# Patient Record
Sex: FEMALE | Race: WHITE | Employment: OTHER | URBAN - METROPOLITAN AREA
[De-identification: names, ages, dates, MRNs, and addresses within clinical notes are randomized per-mention and may not be internally consistent; named-entity substitution may affect disease eponyms.]

---

## 2017-04-12 ENCOUNTER — OFFICE VISIT (OUTPATIENT)
Dept: ORTHOPEDIC SURGERY | Age: 72
End: 2017-04-12

## 2017-04-12 VITALS
BODY MASS INDEX: 27.74 KG/M2 | HEIGHT: 59 IN | HEART RATE: 93 BPM | SYSTOLIC BLOOD PRESSURE: 129 MMHG | DIASTOLIC BLOOD PRESSURE: 85 MMHG | WEIGHT: 137.6 LBS

## 2017-04-12 DIAGNOSIS — M79.644 FINGER PAIN, RIGHT: Primary | ICD-10-CM

## 2017-04-12 PROCEDURE — G8399 PT W/DXA RESULTS DOCUMENT: HCPCS | Performed by: PHYSICIAN ASSISTANT

## 2017-04-12 PROCEDURE — 4040F PNEUMOC VAC/ADMIN/RCVD: CPT | Performed by: PHYSICIAN ASSISTANT

## 2017-04-12 PROCEDURE — 1036F TOBACCO NON-USER: CPT | Performed by: PHYSICIAN ASSISTANT

## 2017-04-12 PROCEDURE — 73140 X-RAY EXAM OF FINGER(S): CPT | Performed by: PHYSICIAN ASSISTANT

## 2017-04-12 PROCEDURE — 3017F COLORECTAL CA SCREEN DOC REV: CPT | Performed by: PHYSICIAN ASSISTANT

## 2017-04-12 PROCEDURE — 99213 OFFICE O/P EST LOW 20 MIN: CPT | Performed by: PHYSICIAN ASSISTANT

## 2017-04-12 PROCEDURE — G8427 DOCREV CUR MEDS BY ELIG CLIN: HCPCS | Performed by: PHYSICIAN ASSISTANT

## 2017-04-12 PROCEDURE — 1123F ACP DISCUSS/DSCN MKR DOCD: CPT | Performed by: PHYSICIAN ASSISTANT

## 2017-04-12 PROCEDURE — 3014F SCREEN MAMMO DOC REV: CPT | Performed by: PHYSICIAN ASSISTANT

## 2017-04-12 PROCEDURE — G8420 CALC BMI NORM PARAMETERS: HCPCS | Performed by: PHYSICIAN ASSISTANT

## 2017-04-12 PROCEDURE — 1090F PRES/ABSN URINE INCON ASSESS: CPT | Performed by: PHYSICIAN ASSISTANT

## 2017-04-28 ENCOUNTER — HOSPITAL ENCOUNTER (OUTPATIENT)
Dept: MAMMOGRAPHY | Age: 72
Discharge: HOME | End: 2017-04-28
Attending: OBSTETRICS & GYNECOLOGY | Admitting: OBSTETRICS & GYNECOLOGY

## 2017-04-28 DIAGNOSIS — Z12.31 ENCOUNTER FOR SCREENING MAMMOGRAM FOR MALIGNANT NEOPLASM OF BREAST: ICD-10-CM

## 2017-12-02 NOTE — OP NOTE
solutions placed on the eye. The eye was covered with a metal shield. The patient went to the PACU in excellent condition, having tolerated the procedure extremely well, and will follow up with me tomorrow for postop day 1 care.     Stevie Winn MD, PhD, FACS

## 2017-12-07 ENCOUNTER — HOSPITAL ENCOUNTER (OUTPATIENT)
Dept: SURGERY | Age: 72
Discharge: HOME | End: 2017-12-07
Attending: OPHTHALMOLOGY | Admitting: OPHTHALMOLOGY

## 2017-12-07 VITALS
WEIGHT: 135 LBS | DIASTOLIC BLOOD PRESSURE: 75 MMHG | TEMPERATURE: 97.1 F | SYSTOLIC BLOOD PRESSURE: 145 MMHG | OXYGEN SATURATION: 96 % | BODY MASS INDEX: 26.5 KG/M2 | HEIGHT: 60 IN | HEART RATE: 88 BPM | RESPIRATION RATE: 16 BRPM

## 2017-12-07 RX ORDER — LIDOCAINE HYDROCHLORIDE 10 MG/ML
2 INJECTION, SOLUTION INFILTRATION; PERINEURAL
Status: DISPENSED | OUTPATIENT
Start: 2017-12-07 | End: 2017-12-07

## 2017-12-07 RX ORDER — SODIUM CHLORIDE, SODIUM LACTATE, POTASSIUM CHLORIDE, CALCIUM CHLORIDE 600; 310; 30; 20 MG/100ML; MG/100ML; MG/100ML; MG/100ML
INJECTION, SOLUTION INTRAVENOUS CONTINUOUS
Status: DISCONTINUED | OUTPATIENT
Start: 2017-12-07 | End: 2017-12-08 | Stop reason: HOSPADM

## 2017-12-07 RX ADMIN — SODIUM CHLORIDE, SODIUM LACTATE, POTASSIUM CHLORIDE, CALCIUM CHLORIDE: 600; 310; 30; 20 INJECTION, SOLUTION INTRAVENOUS at 08:00

## 2017-12-07 ASSESSMENT — PAIN SCALES - GENERAL: PAINLEVEL_OUTOF10: 0

## 2017-12-07 ASSESSMENT — PAIN - FUNCTIONAL ASSESSMENT: PAIN_FUNCTIONAL_ASSESSMENT: 0-10

## 2017-12-07 NOTE — ANESTHESIA PRE-OP
Department of Anesthesiology  Preprocedure Note       Name:  Abimael Yu   Age:  81 y.o.  :  1945                                          MRN:  [id number]         Date:  2017      Surgeon:    Procedure:    Medications prior to admission:   Prior to Admission medications    Medication Sig Start Date End Date Taking?  Authorizing Provider   CALCIUM PO Take by mouth daily   Yes Historical Provider, MD   Omega-3 Fatty Acids (FISH OIL PO) Take by mouth daily   Yes Historical Provider, MD   Multiple Vitamins-Minerals (MULTIVITAMIN PO) Take by mouth daily   Yes Historical Provider, MD   losartan-hydrochlorothiazide (HYZAAR) 100-12.5 MG per tablet Take 1 tablet by mouth daily   Yes Historical Provider, MD   balsalazide (COLAZAL) 750 MG capsule Take 2,250 mg by mouth 3 times daily   Yes Historical Provider, MD   dicyclomine (BENTYL) 20 MG tablet Take 20 mg by mouth 3 times daily    Yes Historical Provider, MD   omeprazole (PRILOSEC) 40 MG capsule Take 40 mg by mouth daily   Yes Historical Provider, MD   atorvastatin (LIPITOR) 20 MG tablet Take 20 mg by mouth daily   Yes Historical Provider, MD   aspirin 81 MG tablet Take 81 mg by mouth daily    Historical Provider, MD       Current medications:    Current Outpatient Prescriptions   Medication Sig Dispense Refill    CALCIUM PO Take by mouth daily      Omega-3 Fatty Acids (FISH OIL PO) Take by mouth daily      Multiple Vitamins-Minerals (MULTIVITAMIN PO) Take by mouth daily      losartan-hydrochlorothiazide (HYZAAR) 100-12.5 MG per tablet Take 1 tablet by mouth daily      balsalazide (COLAZAL) 750 MG capsule Take 2,250 mg by mouth 3 times daily      dicyclomine (BENTYL) 20 MG tablet Take 20 mg by mouth 3 times daily       omeprazole (PRILOSEC) 40 MG capsule Take 40 mg by mouth daily      atorvastatin (LIPITOR) 20 MG tablet Take 20 mg by mouth daily      aspirin 81 MG tablet Take 81 mg by mouth daily       Current Facility-Administered Medications

## 2018-07-17 ENCOUNTER — HOSPITAL ENCOUNTER (OUTPATIENT)
Dept: WOMENS IMAGING | Age: 73
Discharge: HOME | End: 2018-07-17
Payer: MEDICARE

## 2018-07-17 DIAGNOSIS — M85.80 OTHER SPECIFIED DISORDERS OF BONE DENSITY AND STRUCTURE, UNSPECIFIED SITE (CODE): ICD-10-CM

## 2018-07-17 DIAGNOSIS — M85.80 OSTEOPENIA, UNSPECIFIED LOCATION: ICD-10-CM

## 2018-07-17 DIAGNOSIS — Z12.31 ENCOUNTER FOR SCREENING MAMMOGRAM FOR BREAST CANCER: ICD-10-CM

## 2018-07-17 PROCEDURE — 77067 SCR MAMMO BI INCL CAD: CPT

## 2018-07-17 PROCEDURE — 77080 DXA BONE DENSITY AXIAL: CPT

## 2019-04-25 ENCOUNTER — OFFICE VISIT (OUTPATIENT)
Dept: ORTHOPEDIC SURGERY | Age: 74
End: 2019-04-25
Payer: MEDICARE

## 2019-04-25 VITALS — WEIGHT: 134.92 LBS | HEIGHT: 60 IN | RESPIRATION RATE: 15 BRPM | BODY MASS INDEX: 26.49 KG/M2

## 2019-04-25 DIAGNOSIS — M65.341 TRIGGER FINGER, RIGHT RING FINGER: ICD-10-CM

## 2019-04-25 PROCEDURE — 1090F PRES/ABSN URINE INCON ASSESS: CPT | Performed by: ORTHOPAEDIC SURGERY

## 2019-04-25 PROCEDURE — G8399 PT W/DXA RESULTS DOCUMENT: HCPCS | Performed by: ORTHOPAEDIC SURGERY

## 2019-04-25 PROCEDURE — 20550 NJX 1 TENDON SHEATH/LIGAMENT: CPT | Performed by: ORTHOPAEDIC SURGERY

## 2019-04-25 PROCEDURE — 3017F COLORECTAL CA SCREEN DOC REV: CPT | Performed by: ORTHOPAEDIC SURGERY

## 2019-04-25 PROCEDURE — 1036F TOBACCO NON-USER: CPT | Performed by: ORTHOPAEDIC SURGERY

## 2019-04-25 PROCEDURE — 99213 OFFICE O/P EST LOW 20 MIN: CPT | Performed by: ORTHOPAEDIC SURGERY

## 2019-04-25 PROCEDURE — G8419 CALC BMI OUT NRM PARAM NOF/U: HCPCS | Performed by: ORTHOPAEDIC SURGERY

## 2019-04-25 PROCEDURE — 4040F PNEUMOC VAC/ADMIN/RCVD: CPT | Performed by: ORTHOPAEDIC SURGERY

## 2019-04-25 PROCEDURE — 1123F ACP DISCUSS/DSCN MKR DOCD: CPT | Performed by: ORTHOPAEDIC SURGERY

## 2019-04-25 PROCEDURE — G8427 DOCREV CUR MEDS BY ELIG CLIN: HCPCS | Performed by: ORTHOPAEDIC SURGERY

## 2019-04-25 RX ORDER — TRAZODONE HYDROCHLORIDE 50 MG/1
TABLET ORAL
Refills: 0 | COMMUNITY
Start: 2019-03-14

## 2019-04-25 RX ORDER — TOLTERODINE 4 MG/1
4 CAPSULE, EXTENDED RELEASE ORAL PRN
COMMUNITY

## 2019-04-25 NOTE — PROGRESS NOTES
HISTORY OF PRESENT ILLNESS:  The patient is a 37-year-old right-hand-dominant female and I've seen her  in the past for trigger finger. She presents today for herself or new hand surgery specialty evaluation regarding locking of the right ring finger which has been ongoing for the last several weeks. She does not remember any specific injury. She has noticed some osteoarthritic changes to multiple IP joints and they have not been changing over the last few weeks as well. PAST MEDICAL HISTORY: Patient's medications, allergies, past medical, surgical, social and family histories were reviewed and updated as appropriate. ROS: Pertinent items are noted in HPI. Review of systems reviewed from patient history form dated on 4/25/19 and available in the patient's chart under the media tab. Denies fever, chills, confusion, bowel/bladder active change. PHYSICAL EXAMINATION: Examination reveals a pleasant individual in no acute distress. There appears to be satisfactory pain-free range of motion, strength, and stability of the cervical spine, shoulders, and elbows. Skin is intact without lymphadenopathy, discoloration, or abnormal temperature. There is intact, symmetric circulation in both upper extremities. Wrist, hand, and digital range of motion is satisfactory bilaterally in the unaffected digits. Does have some broadening at the IP's consistent with osteoarthritis but full range of motion. Tenderness exists at the A1 pulley of the ring finger with grade 2 triggering. DIAGNOSTIC TESTING:        IMPRESSION AND PLAN: Right ring trigger digit. I discussed the diagnosis with her and we talked about treatment options. I've offered her an initial cortisone injection for the right ring trigger digit. Under sterile conditions, I injected the right ring finger  at the A1 pulley and flexor tendon sheath with 1% lidocaine and 1 mL of Celestone.  Appropriate injection risks and instructions were discussed. Over the next 4-6 weeks if she fails to have lasting relief she would be an excellent candidate for elective trigger finger release if warranted.

## 2019-04-25 NOTE — PROGRESS NOTES
INJECTION ADMINISTRATION DETAILS:    Date & Time:  4/25/19 9:57 AM  Site & Comments: RT RING TRIGGER FINGER  Administered by Dr Irasema Dahl    Betamethasone (30 mg/ml)  # of Cc: 1  NDC:  0998-1957-11  Lot Number: [id number]  EXP: 04/18/2020    1% Lidocaine ( 10mg/mL)  # of CC : 1  NDC #: 3263-1835-12  LOT# :-[id number]  EXP :10/1/2020

## 2019-08-09 ENCOUNTER — OFFICE VISIT (OUTPATIENT)
Dept: ORTHOPEDIC SURGERY | Age: 74
End: 2019-08-09
Payer: MEDICARE

## 2019-08-09 VITALS — HEIGHT: 60 IN | WEIGHT: 134.92 LBS | RESPIRATION RATE: 15 BRPM | BODY MASS INDEX: 26.49 KG/M2

## 2019-08-09 DIAGNOSIS — M65.341 TRIGGER FINGER, RIGHT RING FINGER: Primary | ICD-10-CM

## 2019-08-09 PROCEDURE — 1036F TOBACCO NON-USER: CPT | Performed by: ORTHOPAEDIC SURGERY

## 2019-08-09 PROCEDURE — G8427 DOCREV CUR MEDS BY ELIG CLIN: HCPCS | Performed by: ORTHOPAEDIC SURGERY

## 2019-08-09 PROCEDURE — 99214 OFFICE O/P EST MOD 30 MIN: CPT | Performed by: ORTHOPAEDIC SURGERY

## 2019-08-09 PROCEDURE — G8419 CALC BMI OUT NRM PARAM NOF/U: HCPCS | Performed by: ORTHOPAEDIC SURGERY

## 2019-08-09 PROCEDURE — 1123F ACP DISCUSS/DSCN MKR DOCD: CPT | Performed by: ORTHOPAEDIC SURGERY

## 2019-08-09 PROCEDURE — G8399 PT W/DXA RESULTS DOCUMENT: HCPCS | Performed by: ORTHOPAEDIC SURGERY

## 2019-08-09 PROCEDURE — 3017F COLORECTAL CA SCREEN DOC REV: CPT | Performed by: ORTHOPAEDIC SURGERY

## 2019-08-09 PROCEDURE — 1090F PRES/ABSN URINE INCON ASSESS: CPT | Performed by: ORTHOPAEDIC SURGERY

## 2019-08-09 PROCEDURE — 4040F PNEUMOC VAC/ADMIN/RCVD: CPT | Performed by: ORTHOPAEDIC SURGERY

## 2019-08-14 ENCOUNTER — TELEPHONE (OUTPATIENT)
Dept: ORTHOPEDIC SURGERY | Age: 74
End: 2019-08-14

## 2019-08-14 NOTE — TELEPHONE ENCOUNTER
Auth: NPR  Date: 8/26/19   Reference # None  Spoke with: None  Type of SX: Outpatient  Location: [facility]  CPT [id number]   SX area: Rt hand

## 2019-08-23 ENCOUNTER — ANESTHESIA EVENT (OUTPATIENT)
Dept: OPERATING ROOM | Age: 74
End: 2019-08-23
Payer: MEDICARE

## 2019-08-25 NOTE — OP NOTE
[facility]  Procedure Note  Chelsy Rubi  08/26/2019    PREOPERATIVE DIAGNOSIS(ES)   Right Ring Finger trigger digit     POSTOPERATIVE DIAGNOSIS(ES)   Same    PROCEDURE(S)     Right Ring Finger trigger release    Alisson Hernandez  89. with MAC    COMPLICATIONS None    INDICATIONS FOR PROCEDURE The patient has clinical evidence of triggering of the affected digit(s) and has failed appropriate conservative management. The patient understands the relevant risks, benefits, limitations, and healing process of the procedure. PROCEDURE The patient was brought to the operating room and anesthetized with local anesthetic and MAC sedation. The identified and marked extremity was then prepped and draped in a standard fashion. A well-padded tourniquet was applied to the upper arm. The arm was exsanguinated and the tourniquet elevated to 250 mmHg. A standard transverse incision was made at the A-1 pulley level of the affected digit(s). Dissection was carried down carefully through the skin and subcutaneous tissue. Care was taken to protect the digital neurovascular bundles on both sides of the identified A-1 pulley(s). In each affected digit the pulley was incised longitudinally under direct visualization. Complete proximal and distal release was completed. The flexor tendon structures were withdrawn with a retractor and demonstrated full passive excursion. The tourniquet was released and the wound(s) irrigated with sterile saline. Skin was closed with Nylon suture. A soft, bulky dressing was applied and the patient transported to the recovery area in stable condition with good perfusion to all fingertips and no active triggering.         Stacey. Harika Gilmore 134

## 2019-08-26 ENCOUNTER — HOSPITAL ENCOUNTER (OUTPATIENT)
Age: 74
Setting detail: OUTPATIENT SURGERY
Discharge: HOME | End: 2019-08-26
Attending: ORTHOPAEDIC SURGERY | Admitting: ORTHOPAEDIC SURGERY
Payer: MEDICARE

## 2019-08-26 ENCOUNTER — ANESTHESIA (OUTPATIENT)
Dept: OPERATING ROOM | Age: 74
End: 2019-08-26
Payer: MEDICARE

## 2019-08-26 VITALS — SYSTOLIC BLOOD PRESSURE: 129 MMHG | DIASTOLIC BLOOD PRESSURE: 64 MMHG | OXYGEN SATURATION: 83 %

## 2019-08-26 VITALS
HEIGHT: 60 IN | DIASTOLIC BLOOD PRESSURE: 62 MMHG | RESPIRATION RATE: 18 BRPM | HEART RATE: 75 BPM | SYSTOLIC BLOOD PRESSURE: 129 MMHG | OXYGEN SATURATION: 93 % | TEMPERATURE: 97.5 F | BODY MASS INDEX: 27.48 KG/M2 | WEIGHT: 140 LBS

## 2019-08-26 DIAGNOSIS — M65.341 TRIGGER FINGER, RIGHT RING FINGER: Primary | ICD-10-CM

## 2019-08-26 PROCEDURE — 3600000003 HC SURGERY LEVEL 3 BASE: Performed by: ORTHOPAEDIC SURGERY

## 2019-08-26 PROCEDURE — 7100000001 HC PACU RECOVERY - ADDTL 15 MIN: Performed by: ORTHOPAEDIC SURGERY

## 2019-08-26 PROCEDURE — 2709999900 HC NON-CHARGEABLE SUPPLY: Performed by: ORTHOPAEDIC SURGERY

## 2019-08-26 PROCEDURE — 3600000013 HC SURGERY LEVEL 3 ADDTL 15MIN: Performed by: ORTHOPAEDIC SURGERY

## 2019-08-26 PROCEDURE — 2580000003 HC RX 258: Performed by: ORTHOPAEDIC SURGERY

## 2019-08-26 PROCEDURE — 2500000003 HC RX 250 WO HCPCS: Performed by: ANESTHESIOLOGY

## 2019-08-26 PROCEDURE — 3700000001 HC ADD 15 MINUTES (ANESTHESIA): Performed by: ORTHOPAEDIC SURGERY

## 2019-08-26 PROCEDURE — 6360000002 HC RX W HCPCS: Performed by: NURSE ANESTHETIST, CERTIFIED REGISTERED

## 2019-08-26 PROCEDURE — 7100000010 HC PHASE II RECOVERY - FIRST 15 MIN: Performed by: ORTHOPAEDIC SURGERY

## 2019-08-26 PROCEDURE — 2500000003 HC RX 250 WO HCPCS: Performed by: ORTHOPAEDIC SURGERY

## 2019-08-26 PROCEDURE — 7100000000 HC PACU RECOVERY - FIRST 15 MIN: Performed by: ORTHOPAEDIC SURGERY

## 2019-08-26 PROCEDURE — 7100000011 HC PHASE II RECOVERY - ADDTL 15 MIN: Performed by: ORTHOPAEDIC SURGERY

## 2019-08-26 PROCEDURE — 3700000000 HC ANESTHESIA ATTENDED CARE: Performed by: ORTHOPAEDIC SURGERY

## 2019-08-26 PROCEDURE — 2580000003 HC RX 258: Performed by: ANESTHESIOLOGY

## 2019-08-26 RX ORDER — HYDRALAZINE HYDROCHLORIDE 20 MG/ML
5 INJECTION INTRAMUSCULAR; INTRAVENOUS EVERY 10 MIN PRN
Status: DISCONTINUED | OUTPATIENT
Start: 2019-08-26 | End: 2019-08-26 | Stop reason: HOSPADM

## 2019-08-26 RX ORDER — MEPERIDINE HYDROCHLORIDE 50 MG/ML
12.5 INJECTION INTRAMUSCULAR; INTRAVENOUS; SUBCUTANEOUS EVERY 5 MIN PRN
Status: DISCONTINUED | OUTPATIENT
Start: 2019-08-26 | End: 2019-08-26 | Stop reason: HOSPADM

## 2019-08-26 RX ORDER — SODIUM CHLORIDE 0.9 % (FLUSH) 0.9 %
10 SYRINGE (ML) INJECTION EVERY 12 HOURS SCHEDULED
Status: DISCONTINUED | OUTPATIENT
Start: 2019-08-26 | End: 2019-08-26 | Stop reason: HOSPADM

## 2019-08-26 RX ORDER — HYDROCODONE BITARTRATE AND ACETAMINOPHEN 5; 325 MG/1; MG/1
1 TABLET ORAL EVERY 6 HOURS PRN
Qty: 5 TABLET | Refills: 0 | Status: SHIPPED | OUTPATIENT
Start: 2019-08-26 | End: 2019-09-02

## 2019-08-26 RX ORDER — METOCLOPRAMIDE HYDROCHLORIDE 5 MG/ML
10 INJECTION INTRAMUSCULAR; INTRAVENOUS
Status: DISCONTINUED | OUTPATIENT
Start: 2019-08-26 | End: 2019-08-26 | Stop reason: HOSPADM

## 2019-08-26 RX ORDER — OXYCODONE HYDROCHLORIDE 5 MG/1
10 TABLET ORAL PRN
Status: DISCONTINUED | OUTPATIENT
Start: 2019-08-26 | End: 2019-08-26 | Stop reason: HOSPADM

## 2019-08-26 RX ORDER — PROMETHAZINE HYDROCHLORIDE 25 MG/ML
6.25 INJECTION, SOLUTION INTRAMUSCULAR; INTRAVENOUS
Status: DISCONTINUED | OUTPATIENT
Start: 2019-08-26 | End: 2019-08-26 | Stop reason: HOSPADM

## 2019-08-26 RX ORDER — IBUPROFEN 600 MG/1
600 TABLET ORAL EVERY 6 HOURS PRN
Qty: 60 TABLET | Refills: 1 | Status: SHIPPED | OUTPATIENT
Start: 2019-08-26 | End: 2020-04-20

## 2019-08-26 RX ORDER — MAGNESIUM HYDROXIDE 1200 MG/15ML
LIQUID ORAL CONTINUOUS PRN
Status: COMPLETED | OUTPATIENT
Start: 2019-08-26 | End: 2019-08-26

## 2019-08-26 RX ORDER — LABETALOL HYDROCHLORIDE 5 MG/ML
5 INJECTION, SOLUTION INTRAVENOUS EVERY 10 MIN PRN
Status: DISCONTINUED | OUTPATIENT
Start: 2019-08-26 | End: 2019-08-26 | Stop reason: HOSPADM

## 2019-08-26 RX ORDER — DIPHENHYDRAMINE HYDROCHLORIDE 50 MG/ML
12.5 INJECTION INTRAMUSCULAR; INTRAVENOUS
Status: DISCONTINUED | OUTPATIENT
Start: 2019-08-26 | End: 2019-08-26 | Stop reason: HOSPADM

## 2019-08-26 RX ORDER — ONDANSETRON 2 MG/ML
INJECTION INTRAMUSCULAR; INTRAVENOUS PRN
Status: DISCONTINUED | OUTPATIENT
Start: 2019-08-26 | End: 2019-08-26 | Stop reason: SDUPTHER

## 2019-08-26 RX ORDER — OXYCODONE HYDROCHLORIDE 5 MG/1
5 TABLET ORAL PRN
Status: DISCONTINUED | OUTPATIENT
Start: 2019-08-26 | End: 2019-08-26 | Stop reason: HOSPADM

## 2019-08-26 RX ORDER — PROPOFOL 10 MG/ML
INJECTION, EMULSION INTRAVENOUS PRN
Status: DISCONTINUED | OUTPATIENT
Start: 2019-08-26 | End: 2019-08-26 | Stop reason: SDUPTHER

## 2019-08-26 RX ORDER — SODIUM CHLORIDE, SODIUM LACTATE, POTASSIUM CHLORIDE, CALCIUM CHLORIDE 600; 310; 30; 20 MG/100ML; MG/100ML; MG/100ML; MG/100ML
INJECTION, SOLUTION INTRAVENOUS CONTINUOUS
Status: DISCONTINUED | OUTPATIENT
Start: 2019-08-26 | End: 2019-08-26 | Stop reason: HOSPADM

## 2019-08-26 RX ORDER — SODIUM CHLORIDE 0.9 % (FLUSH) 0.9 %
10 SYRINGE (ML) INJECTION PRN
Status: DISCONTINUED | OUTPATIENT
Start: 2019-08-26 | End: 2019-08-26 | Stop reason: HOSPADM

## 2019-08-26 RX ORDER — MIDAZOLAM HYDROCHLORIDE 1 MG/ML
INJECTION INTRAMUSCULAR; INTRAVENOUS PRN
Status: DISCONTINUED | OUTPATIENT
Start: 2019-08-26 | End: 2019-08-26 | Stop reason: SDUPTHER

## 2019-08-26 RX ORDER — DEXAMETHASONE SODIUM PHOSPHATE 10 MG/ML
INJECTION INTRAMUSCULAR; INTRAVENOUS PRN
Status: DISCONTINUED | OUTPATIENT
Start: 2019-08-26 | End: 2019-08-26 | Stop reason: SDUPTHER

## 2019-08-26 RX ORDER — MORPHINE SULFATE 2 MG/ML
1 INJECTION, SOLUTION INTRAMUSCULAR; INTRAVENOUS EVERY 5 MIN PRN
Status: DISCONTINUED | OUTPATIENT
Start: 2019-08-26 | End: 2019-08-26 | Stop reason: HOSPADM

## 2019-08-26 RX ADMIN — PROPOFOL 60 MG: 10 INJECTION, EMULSION INTRAVENOUS at 09:40

## 2019-08-26 RX ADMIN — MIDAZOLAM HYDROCHLORIDE 2.5 MG: 2 INJECTION, SOLUTION INTRAMUSCULAR; INTRAVENOUS at 09:39

## 2019-08-26 RX ADMIN — FAMOTIDINE 20 MG: 10 INJECTION, SOLUTION INTRAVENOUS at 07:41

## 2019-08-26 RX ADMIN — ONDANSETRON 4 MG: 2 INJECTION INTRAMUSCULAR; INTRAVENOUS at 09:44

## 2019-08-26 RX ADMIN — SODIUM CHLORIDE, POTASSIUM CHLORIDE, SODIUM LACTATE AND CALCIUM CHLORIDE: 600; 310; 30; 20 INJECTION, SOLUTION INTRAVENOUS at 07:41

## 2019-08-26 RX ADMIN — DEXAMETHASONE SODIUM PHOSPHATE 6 MG: 10 INJECTION INTRAMUSCULAR; INTRAVENOUS at 09:44

## 2019-08-26 RX ADMIN — PROPOFOL 20 MG: 10 INJECTION, EMULSION INTRAVENOUS at 09:47

## 2019-08-26 RX ADMIN — MIDAZOLAM HYDROCHLORIDE 2.5 MG: 2 INJECTION, SOLUTION INTRAMUSCULAR; INTRAVENOUS at 09:37

## 2019-08-26 ASSESSMENT — PULMONARY FUNCTION TESTS
PIF_VALUE: 0
PIF_VALUE: 0
PIF_VALUE: 1

## 2019-08-26 ASSESSMENT — PAIN SCALES - GENERAL
PAINLEVEL_OUTOF10: 0

## 2019-08-26 NOTE — ANESTHESIA PRE PROCEDURE
 CALCIUM PO Take by mouth daily      Omega-3 Fatty Acids (FISH OIL PO) Take by mouth daily      Multiple Vitamins-Minerals (MULTIVITAMIN PO) Take by mouth daily      losartan-hydrochlorothiazide (HYZAAR) 100-12.5 MG per tablet Take 1 tablet by mouth daily      balsalazide (COLAZAL) 750 MG capsule Take 2,250 mg by mouth 3 times daily      dicyclomine (BENTYL) 20 MG tablet Take 20 mg by mouth 3 times daily       omeprazole (PRILOSEC) 40 MG capsule Take 40 mg by mouth daily      atorvastatin (LIPITOR) 20 MG tablet Take 20 mg by mouth daily         Allergies: Allergies   Allergen Reactions    Diprivan [Propofol] Nausea Only       Problem List:    Patient Active Problem List   Diagnosis Code    Biceps tendinitis on right M75.21    Trigger finger, right ring finger M65.341       Past Medical History:        Diagnosis Date    HTN (hypertension)     Hyperlipidemia     Osteoarthritis     Osteoporosis     PONV (postoperative nausea and vomiting)     with Diprivan       Past Surgical History:        Procedure Laterality Date    APPENDECTOMY      CATARACT REMOVAL WITH IMPLANT Right 12/07/2017    CHOLECYSTECTOMY      EYE SURGERY Right     retina       Social History:    Social History     Tobacco Use    Smoking status: Never Smoker    Smokeless tobacco: Never Used   Substance Use Topics    Alcohol use:  Yes     Alcohol/week: 0.0 standard drinks     Comment: 0-1 drinks per week                                Counseling given: Not Answered      Vital Signs (Current):   Vitals:    08/20/19 0942   Weight: 140 lb (63.5 kg)   Height: 5' (1.524 m)                                              BP Readings from Last 3 Encounters:   12/07/17 (!) 145/75   04/12/17 129/85   10/05/15 105/66       NPO Status:                                                                                 BMI:   Wt Readings from Last 3 Encounters:   08/09/19 134 lb 14.7 oz (61.2 kg)   04/25/19 134 lb 14.7 oz (61.2 kg)   12/07/17 135 lb (61.2 kg)     Body mass index is 27.34 kg/m². CBC:   Lab Results   Component Value Date    WBC 19.3 02/18/2010    RBC 4.72 02/18/2010    HGB 14.3 02/18/2010    HCT 42.6 02/18/2010    MCV 90.2 02/18/2010    RDW 14.2 02/18/2010     02/18/2010       CMP:   Lab Results   Component Value Date     02/18/2010    K 3.8 02/18/2010     02/18/2010    CO2 29 02/18/2010    BUN 13 02/18/2010    CREATININE 0.6 02/18/2010    GFRAA >60 02/18/2010    GLUCOSE 127 02/18/2010    PROT 7.2 02/18/2010    CALCIUM 10.2 02/18/2010    BILITOT 0.43 02/18/2010    ALKPHOS 119 02/18/2010    AST 28 02/18/2010    ALT 25 02/18/2010       POC Tests: No results for input(s): POCGLU, POCNA, POCK, POCCL, POCBUN, POCHEMO, POCHCT in the last 72 hours. Coags: No results found for: PROTIME, INR, APTT    HCG (If Applicable): No results found for: PREGTESTUR, PREGSERUM, HCG, HCGQUANT     ABGs: No results found for: PHART, PO2ART, AOT9VSZ, TZJ7VTR, BEART, X4PPWLLJ     Type & Screen (If Applicable):  No results found for: [facility]    Anesthesia Evaluation  Patient summary reviewed and Nursing notes reviewed history of anesthetic complications:   Airway: Mallampati: II  TM distance: >3 FB   Neck ROM: full  Mouth opening: > = 3 FB Dental:          Pulmonary:Negative Pulmonary ROS                              Cardiovascular:    (+) hypertension:,                   Neuro/Psych:   Negative Neuro/Psych ROS              GI/Hepatic/Renal: Neg GI/Hepatic/Renal ROS            Endo/Other: Negative Endo/Other ROS                    Abdominal:           Vascular: negative vascular ROS. Anesthesia Plan      general     ASA 3    (70-year-old female presents for RIGHT RING TRIGGER FINGER RELEASE (Right ). Plan general anesthesia with ASA standard monitors. Questions answered. Patient agreeable with anesthetic plan.  )  Induction: intravenous.       Anesthetic plan and risks discussed with

## 2019-08-26 NOTE — PROGRESS NOTES
Discharge instructions reviewed with patient/responsible adult and understanding verbalized. Discharge instructions signed and copies given. Patient discharged home with belongings. No pain. No n/v. Right hand dressing dry and intact. Assist of one to car.

## 2019-08-27 ENCOUNTER — TELEPHONE (OUTPATIENT)
Dept: ORTHOPEDIC SURGERY | Age: 74
End: 2019-08-27

## 2019-08-27 ENCOUNTER — OFFICE VISIT (OUTPATIENT)
Dept: ORTHOPEDIC SURGERY | Age: 74
End: 2019-08-27

## 2019-08-27 DIAGNOSIS — M65.341 TRIGGER FINGER, RIGHT RING FINGER: Primary | ICD-10-CM

## 2019-08-27 PROCEDURE — 99024 POSTOP FOLLOW-UP VISIT: CPT | Performed by: ORTHOPAEDIC SURGERY

## 2019-08-27 NOTE — PROGRESS NOTES
Chief Complaint:  Finger Pain (EARLY PO CK RT RING TFR 8/26/19, HAVING SWELLING AND WARMTH IN FINGERS)      History of Present of Illness: The patient is back and was somewhat concerned after her trigger finger release because her fingers were numb. Review of Systems  Pertinent items are noted in HPI  Denies fever, chills, confusion, bowel/bladder active change. Examination:  Exam today she is moving her fingers very well and there is no sign of infection and only trace swelling. Fingers are perfused and she does have protective sensation in the area of the fingertips. There is no residual triggering. Radiology:     X-rays obtained and reviewed in office:  Views    Location    Impression      No orders of the defined types were placed in this encounter. Impression:  Encounter Diagnosis   Name Primary?  Trigger finger, right ring finger Yes         Treatment Plan:  I reassured the patient that she has an excellent healthy-appearing hand postoperatively and these are to be expected after surgery. She seems relieved by the reassurance and she will continue with the wound care as instructed. She will see us at her regularly scheduled appointment. Please note that this transcription was created using voice recognition software. Any errors are unintentional and may be due to voice recognition transcription.

## 2019-09-09 ENCOUNTER — OFFICE VISIT (OUTPATIENT)
Dept: ORTHOPEDIC SURGERY | Age: 74
End: 2019-09-09

## 2019-09-09 DIAGNOSIS — M65.341 TRIGGER FINGER, RIGHT RING FINGER: Primary | ICD-10-CM

## 2019-09-09 PROCEDURE — 99024 POSTOP FOLLOW-UP VISIT: CPT | Performed by: ORTHOPAEDIC SURGERY

## 2019-10-03 ENCOUNTER — HOSPITAL ENCOUNTER (OUTPATIENT)
Dept: WOMENS IMAGING | Age: 74
Discharge: HOME | End: 2019-10-03
Payer: MEDICARE

## 2019-10-03 DIAGNOSIS — Z12.31 ENCOUNTER FOR SCREENING MAMMOGRAM FOR BREAST CANCER: ICD-10-CM

## 2019-10-03 PROCEDURE — 77067 SCR MAMMO BI INCL CAD: CPT

## 2020-04-20 ENCOUNTER — OFFICE VISIT (OUTPATIENT)
Dept: ORTHOPEDIC SURGERY | Age: 75
End: 2020-04-20
Payer: MEDICARE

## 2020-04-20 VITALS — BODY MASS INDEX: 28.22 KG/M2 | WEIGHT: 140 LBS | HEIGHT: 59 IN

## 2020-04-20 PROCEDURE — 1090F PRES/ABSN URINE INCON ASSESS: CPT | Performed by: ORTHOPAEDIC SURGERY

## 2020-04-20 PROCEDURE — G8427 DOCREV CUR MEDS BY ELIG CLIN: HCPCS | Performed by: ORTHOPAEDIC SURGERY

## 2020-04-20 PROCEDURE — 4040F PNEUMOC VAC/ADMIN/RCVD: CPT | Performed by: ORTHOPAEDIC SURGERY

## 2020-04-20 PROCEDURE — G8417 CALC BMI ABV UP PARAM F/U: HCPCS | Performed by: ORTHOPAEDIC SURGERY

## 2020-04-20 PROCEDURE — G8399 PT W/DXA RESULTS DOCUMENT: HCPCS | Performed by: ORTHOPAEDIC SURGERY

## 2020-04-20 PROCEDURE — 1123F ACP DISCUSS/DSCN MKR DOCD: CPT | Performed by: ORTHOPAEDIC SURGERY

## 2020-04-20 PROCEDURE — E0100 CANE ADJUST/FIXED WITH TIP: HCPCS | Performed by: ORTHOPAEDIC SURGERY

## 2020-04-20 PROCEDURE — 99213 OFFICE O/P EST LOW 20 MIN: CPT | Performed by: ORTHOPAEDIC SURGERY

## 2020-04-20 PROCEDURE — 3017F COLORECTAL CA SCREEN DOC REV: CPT | Performed by: ORTHOPAEDIC SURGERY

## 2020-04-20 PROCEDURE — 1036F TOBACCO NON-USER: CPT | Performed by: ORTHOPAEDIC SURGERY

## 2020-05-18 ENCOUNTER — OFFICE VISIT (OUTPATIENT)
Dept: ORTHOPEDIC SURGERY | Age: 75
End: 2020-05-18
Payer: MEDICARE

## 2020-05-18 PROCEDURE — 1090F PRES/ABSN URINE INCON ASSESS: CPT | Performed by: ORTHOPAEDIC SURGERY

## 2020-05-18 PROCEDURE — 3017F COLORECTAL CA SCREEN DOC REV: CPT | Performed by: ORTHOPAEDIC SURGERY

## 2020-05-18 PROCEDURE — 99213 OFFICE O/P EST LOW 20 MIN: CPT | Performed by: ORTHOPAEDIC SURGERY

## 2020-05-18 PROCEDURE — G8417 CALC BMI ABV UP PARAM F/U: HCPCS | Performed by: ORTHOPAEDIC SURGERY

## 2020-05-18 PROCEDURE — 4040F PNEUMOC VAC/ADMIN/RCVD: CPT | Performed by: ORTHOPAEDIC SURGERY

## 2020-05-18 PROCEDURE — 1036F TOBACCO NON-USER: CPT | Performed by: ORTHOPAEDIC SURGERY

## 2020-05-18 PROCEDURE — G8428 CUR MEDS NOT DOCUMENT: HCPCS | Performed by: ORTHOPAEDIC SURGERY

## 2020-05-18 PROCEDURE — G8399 PT W/DXA RESULTS DOCUMENT: HCPCS | Performed by: ORTHOPAEDIC SURGERY

## 2020-05-18 PROCEDURE — 1123F ACP DISCUSS/DSCN MKR DOCD: CPT | Performed by: ORTHOPAEDIC SURGERY

## 2020-05-18 NOTE — PROGRESS NOTES
Inspection/examination of the right lower extremity does not show any tenderness, deformity or injury. Range of motion is full. There is no gross instability. There are no rashes, ulcerations or lesions. Strength and tone are normal.  ·   · LEFT LOWER EXTREMITY:  Inspection/examination of the left lower extremity does not show any tenderness, deformity or injury. Range of motion is full. There is no gross instability. There are no rashes, ulcerations or lesions. Strength and tone are normal.        Diagnostic Testing:  X-rays ordered today and reviewed of the lumbar spine. 2 views. AP and lateral views. These did include her hip joints also. No evidence of fractures or dislocations. Well-maintained hip joint space. Mild to moderate lumbar degenerative disc disease most pronounced at L5-S1. There is also grade 1 spondylolisthesis at L5-S1. Impression: Lumbar degenerative disc disease and sacroiliitis. Plan: Patient will do home exercise program concentrating on SI joint stretching. Patient already currently takes NSAIDs for her knee. We will see her back on a as needed basis. If she continues to have issues with her SI joint and lumbar spine would recommend she follow-up with Dr. Jair Lam.         Laura Cha

## 2021-04-28 ENCOUNTER — HOSPITAL ENCOUNTER (OUTPATIENT)
Dept: WOMENS IMAGING | Age: 76
Discharge: HOME | End: 2021-04-28
Payer: MEDICARE

## 2021-04-28 VITALS — BODY MASS INDEX: 28.22 KG/M2 | HEIGHT: 59 IN | WEIGHT: 140 LBS

## 2021-04-28 DIAGNOSIS — Z12.31 VISIT FOR SCREENING MAMMOGRAM: ICD-10-CM

## 2021-04-28 PROCEDURE — 77063 BREAST TOMOSYNTHESIS BI: CPT

## 2021-08-10 ENCOUNTER — HOSPITAL ENCOUNTER (OUTPATIENT)
Dept: WOMENS IMAGING | Age: 76
Discharge: HOME | End: 2021-08-10
Payer: MEDICARE

## 2021-08-10 DIAGNOSIS — M85.80 OSTEOPENIA, UNSPECIFIED LOCATION: ICD-10-CM

## 2021-08-10 DIAGNOSIS — Z78.0 POSTMENOPAUSAL: ICD-10-CM

## 2021-08-10 PROCEDURE — 77080 DXA BONE DENSITY AXIAL: CPT

## 2022-08-01 ENCOUNTER — HOSPITAL ENCOUNTER (OUTPATIENT)
Dept: WOMENS IMAGING | Age: 77
Discharge: HOME | End: 2022-08-01
Payer: MEDICARE

## 2022-08-01 DIAGNOSIS — Z12.31 ENCOUNTER FOR SCREENING MAMMOGRAM FOR MALIGNANT NEOPLASM OF BREAST: ICD-10-CM

## 2022-08-01 PROCEDURE — 77063 BREAST TOMOSYNTHESIS BI: CPT

## 2023-08-03 ENCOUNTER — HOSPITAL ENCOUNTER (OUTPATIENT)
Dept: WOMENS IMAGING | Age: 78
Discharge: HOME | End: 2023-08-03
Payer: MEDICARE

## 2023-08-03 DIAGNOSIS — Z12.31 ENCOUNTER FOR SCREENING MAMMOGRAM FOR BREAST CANCER: ICD-10-CM

## 2023-08-03 PROCEDURE — 77067 SCR MAMMO BI INCL CAD: CPT

## 2023-10-25 NOTE — H&P
The H&P was reviewed, the patient was examined, and no change has occurred in the patient's condition since the H&P was completed.     Maulik Nava MD, PhD, FACS Litfulo Pregnancy And Lactation Text: Based on animal studies, Lifulo may cause embryo-fetal harm when administered to pregnant women.  The medication should not be used in pregnancy.  Breastfeeding is not recommended during treatment.

## 2023-11-25 ENCOUNTER — HOSPITAL ENCOUNTER (EMERGENCY)
Age: 78
Discharge: HOME | End: 2023-11-25
Payer: MEDICARE

## 2023-11-25 ENCOUNTER — APPOINTMENT (OUTPATIENT)
Dept: GENERAL RADIOLOGY | Age: 78
End: 2023-11-25
Payer: MEDICARE

## 2023-11-25 VITALS
SYSTOLIC BLOOD PRESSURE: 133 MMHG | TEMPERATURE: 98.2 F | HEIGHT: 60 IN | RESPIRATION RATE: 16 BRPM | BODY MASS INDEX: 26.11 KG/M2 | HEART RATE: 79 BPM | OXYGEN SATURATION: 100 % | WEIGHT: 133 LBS | DIASTOLIC BLOOD PRESSURE: 70 MMHG

## 2023-11-25 DIAGNOSIS — S42.292A OTHER CLOSED DISPLACED FRACTURE OF PROXIMAL END OF LEFT HUMERUS, INITIAL ENCOUNTER: Primary | ICD-10-CM

## 2023-11-25 PROBLEM — S42.202A CLOSED FRACTURE OF LEFT PROXIMAL HUMERUS: Status: ACTIVE | Noted: 2023-11-25

## 2023-11-25 PROCEDURE — 73060 X-RAY EXAM OF HUMERUS: CPT

## 2023-11-25 PROCEDURE — 99222 1ST HOSP IP/OBS MODERATE 55: CPT | Performed by: ORTHOPAEDIC SURGERY

## 2023-11-25 PROCEDURE — 99283 EMERGENCY DEPT VISIT LOW MDM: CPT

## 2023-11-25 PROCEDURE — 6370000000 HC RX 637 (ALT 250 FOR IP): Performed by: PHYSICIAN ASSISTANT

## 2023-11-25 RX ORDER — OXYCODONE HYDROCHLORIDE AND ACETAMINOPHEN 5; 325 MG/1; MG/1
1 TABLET ORAL EVERY 6 HOURS PRN
Qty: 15 TABLET | Refills: 0 | Status: SHIPPED | OUTPATIENT
Start: 2023-11-25 | End: 2023-11-29

## 2023-11-25 RX ORDER — OXYCODONE HYDROCHLORIDE AND ACETAMINOPHEN 5; 325 MG/1; MG/1
1 TABLET ORAL ONCE
Status: COMPLETED | OUTPATIENT
Start: 2023-11-25 | End: 2023-11-25

## 2023-11-25 RX ADMIN — OXYCODONE HYDROCHLORIDE AND ACETAMINOPHEN 1 TABLET: 5; 325 TABLET ORAL at 19:17

## 2023-11-25 ASSESSMENT — PAIN SCALES - GENERAL
PAINLEVEL_OUTOF10: 8
PAINLEVEL_OUTOF10: 5
PAINLEVEL_OUTOF10: 8

## 2023-11-25 ASSESSMENT — PAIN - FUNCTIONAL ASSESSMENT
PAIN_FUNCTIONAL_ASSESSMENT: 0-10
PAIN_FUNCTIONAL_ASSESSMENT: NONE - DENIES PAIN

## 2023-11-25 ASSESSMENT — PAIN DESCRIPTION - ORIENTATION: ORIENTATION: LEFT

## 2023-11-25 ASSESSMENT — PAIN DESCRIPTION - LOCATION: LOCATION: ARM

## 2023-11-26 NOTE — CONSULTS
[facility] Orthopedic Surgery  Consult Note         This patient is seen in consultation at the request of Luisa Pompa. Reason for Consult:  Left shoulder pain/ minimally displaced proximal humerus fracture. CHIEF COMPLAINT:  Left shoulder pain/ fall. History Obtained From:  patient, spouse, electronic medical record    HISTORY OF PRESENT ILLNESS:    Ms. Gamaliel Bell is a 70 y.o.  female right handed who seen today at [facility] ED for evaluation of a left shoulder injury. The patient reports that this injury occurred when she fell down steps at [facility]. She was first seen and evaluated in [facility], when she was x-rayed and splinted, and I was consulted. The patient denies any other injuries. Rates pain a 8/10 VAS moderate, sharp, constant and show no change. Alleviating factors rest. Movement makes the pain worse, the sling and resting makes the pain better. No numbness or tingling sensation. Past Medical History:        Diagnosis Date    HTN (hypertension)     Hyperlipidemia     Osteoarthritis     Osteoporosis     PONV (postoperative nausea and vomiting)     with Diprivan       Past Surgical History:        Procedure Laterality Date    APPENDECTOMY      CATARACT REMOVAL WITH IMPLANT Right 12/07/2017    CHOLECYSTECTOMY      EYE SURGERY Right     retina    FINGER TRIGGER RELEASE Right 8/26/2019    RIGHT RING TRIGGER FINGER RELEASE performed by Ramón Melton MD at [address] [place] [address]       Medications prior to admission:   Prior to Admission medications    Medication Sig Start Date End Date Taking?  Authorizing Provider   traZODone (DESYREL) 50 MG tablet take 1 tablet by mouth nightly as needed for sleep 3/14/19   Faye Hernandez MD   tolterodine (DETROL LA) 4 MG extended release capsule Take 4 mg by mouth as needed   Patient not taking: Reported on 11/25/2023    Faye Hernandez MD   VENTOLIN  (90 Base) MCG/ACT inhaler Inhale into the lungs as needed  3/19/19   Faye Hernandez

## 2023-11-26 NOTE — ED NOTES
Pt placed in Large sling to left arm/ice pack for at home comfort.      Ifeanyi Sarabia LPN  41/33/48 0724

## 2023-11-26 NOTE — ED NOTES
Pt scripts x1 instructed to follow up with [facility]. Assessed per Eileen FELICIANO.      Carmencita Eldridge LPN  63/45/59 [id number]

## 2023-11-27 SDOH — HEALTH STABILITY: PHYSICAL HEALTH: ON AVERAGE, HOW MANY MINUTES DO YOU ENGAGE IN EXERCISE AT THIS LEVEL?: 0 MIN

## 2023-11-27 ASSESSMENT — SOCIAL DETERMINANTS OF HEALTH (SDOH)
WITHIN THE LAST YEAR, HAVE YOU BEEN KICKED, HIT, SLAPPED, OR OTHERWISE PHYSICALLY HURT BY YOUR PARTNER OR EX-PARTNER?: NO
WITHIN THE LAST YEAR, HAVE YOU BEEN AFRAID OF YOUR PARTNER OR EX-PARTNER?: NO
WITHIN THE LAST YEAR, HAVE YOU BEEN HUMILIATED OR EMOTIONALLY ABUSED IN OTHER WAYS BY YOUR PARTNER OR EX-PARTNER?: NO
WITHIN THE LAST YEAR, HAVE TO BEEN RAPED OR FORCED TO HAVE ANY KIND OF SEXUAL ACTIVITY BY YOUR PARTNER OR EX-PARTNER?: NO

## 2023-11-28 ENCOUNTER — OFFICE VISIT (OUTPATIENT)
Dept: ORTHOPEDIC SURGERY | Age: 78
End: 2023-11-28

## 2023-11-28 DIAGNOSIS — S42.202A CLOSED FRACTURE OF PROXIMAL END OF LEFT HUMERUS, UNSPECIFIED FRACTURE MORPHOLOGY, INITIAL ENCOUNTER: Primary | ICD-10-CM

## 2023-11-28 DIAGNOSIS — M25.571 ACUTE RIGHT ANKLE PAIN: ICD-10-CM

## 2023-11-28 DIAGNOSIS — S93.401A SPRAIN OF RIGHT ANKLE, UNSPECIFIED LIGAMENT, INITIAL ENCOUNTER: ICD-10-CM

## 2023-11-29 PROBLEM — S93.401A SPRAIN OF RIGHT ANKLE: Status: ACTIVE | Noted: 2023-11-29

## 2023-11-29 NOTE — PROGRESS NOTES
CHIEF COMPLAINT:   1- Left shoulder pain/ minimally displaced proximal humerus fracture. 2- Right ankle pain/ Ankle ATF sprain. DATE OF INJURY: 11/25/2023    HISTORY: Emily Regan 70 y.o.  female right handed who seen today for f/u. She was seen on 11/25/2023 at [facility] ED for consultation and evaluation of a left shoulder injury. The patient reports that this injury occurred when she fell down steps at [facility]. She was first seen and evaluated in [facility], when she was x-rayed and splinted, and I was consulted. The patient denies any other injuries. Rates pain a 8/10 VAS moderate, sharp, constant and show no change. Alleviating factors rest. Movement makes the pain worse, the sling and resting makes the pain better. No numbness or tingling sensation. She also came for evaluation of a right ankle injury which occurred when fell. She is complaining of lateral ankle pain. She reports today mild pain. Pain increase with walking and decrease with rest and elevation. No numbness or tingling sensation, and no other complaint. Past Medical History:   Diagnosis Date    HTN (hypertension)     Hyperlipidemia     Osteoarthritis     Osteoporosis     PONV (postoperative nausea and vomiting)     with Diprivan       Past Surgical History:   Procedure Laterality Date    APPENDECTOMY      CATARACT REMOVAL WITH IMPLANT Right 12/07/2017    CHOLECYSTECTOMY      EYE SURGERY Right     retina    FINGER TRIGGER RELEASE Right 8/26/2019    RIGHT RING TRIGGER FINGER RELEASE performed by Quang Neil MD at [address] [place] [address] History    Marital status:      Spouse name: Not on file    Number of children: Not on file    Years of education: Not on file    Highest education level: Not on file   Occupational History    Not on file   Tobacco Use    Smoking status: Never    Smokeless tobacco: Never   Substance and Sexual Activity    Alcohol use:  Yes     Alcohol/week: 0.0 standard

## 2024-01-09 ENCOUNTER — OFFICE VISIT (OUTPATIENT)
Dept: ORTHOPEDIC SURGERY | Age: 79
End: 2024-01-09

## 2024-01-09 VITALS — WEIGHT: 133 LBS | HEIGHT: 60 IN | BODY MASS INDEX: 26.11 KG/M2

## 2024-01-09 DIAGNOSIS — S93.401A SPRAIN OF RIGHT ANKLE, UNSPECIFIED LIGAMENT, INITIAL ENCOUNTER: ICD-10-CM

## 2024-01-09 DIAGNOSIS — R22.32 FINGER MASS, LEFT: ICD-10-CM

## 2024-01-09 DIAGNOSIS — S42.202A CLOSED FRACTURE OF PROXIMAL END OF LEFT HUMERUS, UNSPECIFIED FRACTURE MORPHOLOGY, INITIAL ENCOUNTER: Primary | ICD-10-CM

## 2024-01-12 ENCOUNTER — HOSPITAL ENCOUNTER (OUTPATIENT)
Dept: PHYSICAL THERAPY | Age: 79
Setting detail: THERAPIES SERIES
Discharge: HOME | End: 2024-01-12
Payer: MEDICARE

## 2024-01-12 PROCEDURE — 97112 NEUROMUSCULAR REEDUCATION: CPT | Performed by: PHYSICAL THERAPIST

## 2024-01-12 PROCEDURE — 97161 PT EVAL LOW COMPLEX 20 MIN: CPT | Performed by: PHYSICAL THERAPIST

## 2024-01-12 PROCEDURE — 97110 THERAPEUTIC EXERCISES: CPT | Performed by: PHYSICAL THERAPIST

## 2024-01-12 NOTE — THERAPY EVALUATION
by patients functional deficits.  Status: [] Progressing: [] Met: [] Not Met: [] Adjusted  {Strength goal:46155}   Status: [] Progressing: [] Met: [] Not Met: [] Adjusted  Patient will return to {UE functions:66817} without increased symptoms or restriction to work towards return to prior level of function.       Status: [] Progressing: [] Met: [] Not Met: [] Adjusted  {UE Function ADL Goals:01187}            Status: [] Progressing: [] Met: [] Not Met: [] Adjusted    TREATMENT PLAN     Frequency/Duration: {POC:62054}/week for {weeks:41806} weeks for the following treatment interventions:    Interventions:  [x] Therapeutic exercise including: strength training, ROM, including postural re-education.   [x] NMR activation and proprioception, including postural re-education.    [x] Manual therapy as indicated to include: {Man. Interventions:65146::\"PROM\",\"Gr I-IV mobilizations\",\"STM\"}  [x] Modalities as needed that may include: {modalities:75612}  [x] Patient education on joint protection, postural re-education, activity modification, progression of HEP.        [] Aquatic Therapy     HEP instruction: Refer to HEP instructions outlined on the flowsheet on 01/12/2024.    Electronically Signed by Dalia Adames PT  Date: 01/12/2024

## 2024-01-12 NOTE — FLOWSHEET NOTE
{Location:Formerly named [facility]}      Physical Therapy: TREATMENT/PROGRESS NOTE   Patient: Marli Lewis (78 y.o. female)   Treatment Date: 2024   :  1945 MRN: [id number]   Visit #: Visit count could not be calculated. Make sure you are using a visit which is associated with an episode.   Insurance Allowable Auth Needed   *** []Yes    []No    Insurance: Payor: HU[employer]NA MEDICARE / Plan: HU[employer]NA CHOICE-PPO MEDICARE / Product Type: *No Product type* /   Insurance ID: [id number] - (Medicare Managed)  Secondary Insurance (if applicable):    Treatment Diagnosis: ***  No diagnosis found.   Medical Diagnosis:    Closed fracture of proximal end of left humerus, unspecified fracture morphology, initial encounter []   Referring Physician: Kalen Villegas MD  PCP: Ann Mondragon APRN - [facility]                             Plan of care signed (Y/N):     Date of Patient follow up with Physician:      Progress Report/POC: {Progress:85117::\"EVAL today\"}  POC update due: (10 visits /OR AUTH LIMITS, whichever is less) *** 2024     *** (Cut and paste Precautions/Contraindications from Eval)    Preferred Language for Healthcare:   [x]English       []other:    SUBJECTIVE EXAMINATION     Patient Report/Comments: see eval     Test used Initial score  2024   Pain Summary VAS ***    Functional questionnaire {outcome measures:21494} ***    Other:                OBJECTIVE EXAMINATION     Observation:     Test measurements: see eval    Exercises/Interventions:     Therapeutic Ex (59152)  resistance Sets/time Reps Notes/Cues/Progressions                                                                         Manual Intervention (01319)  TIME                                        NMR re-education (01312) resistance Sets/time Reps CUES NEEDED                                      Therapeutic Activity (70042)  Sets/time                                          Modalities:    No modalities applied this

## 2024-01-14 PROBLEM — R22.32 FINGER MASS, LEFT: Status: ACTIVE | Noted: 2024-01-14

## 2024-01-19 ENCOUNTER — HOSPITAL ENCOUNTER (OUTPATIENT)
Dept: PHYSICAL THERAPY | Age: 79
Setting detail: THERAPIES SERIES
Discharge: HOME | End: 2024-01-19
Payer: MEDICARE

## 2024-01-19 PROCEDURE — 97110 THERAPEUTIC EXERCISES: CPT | Performed by: PHYSICAL THERAPIST

## 2024-01-19 PROCEDURE — 97140 MANUAL THERAPY 1/> REGIONS: CPT | Performed by: PHYSICAL THERAPIST

## 2024-01-19 NOTE — FLOWSHEET NOTE
[facility]- Outpatient [facility]  [address], [place] [address] office: [phone] fax: [phone]      Physical Therapy: TREATMENT/PROGRESS NOTE   Patient: Marli Lewis (79 y.o. female)   Treatment Date: 2024   :  1945 MRN: [id number]   Visit #: 2  Insurance Allowable Auth Needed   24 24-3/29/24 [x]Yes    []No    Insurance: Payor: HU[employer]NA MEDICARE / Plan: HU[employer]NA CHOICE-PPO MEDICARE / Product Type: *No Product type* /   Insurance ID: [id number] - (Medicare Managed)  Secondary Insurance (if applicable):    Treatment Diagnosis:   Left shoulder pain M25.512    Medical Diagnosis:    Closed fracture of proximal end of left humerus, unspecified fracture morphology, initial encounter [S42]   Referring Physician: Kalen Villegas MD  PCP: Ann Mondragon APRN - [facility]                             Plan of care signed (Y/N):     Date of Patient follow up with Physician:      Progress Report/POC: NO  POC update due: (10 visits /OR AUTH LIMITS, whichever is less)  2024     Precautions/ Contra-indications:                                                                                          Latex allergy:  NO  Pacemaker:    NO  Contraindications for Manipulation: None and recent surgical history (relative)  Date of Surgery:   Other:      Red Flags:  None    Preferred Language for Healthcare:   [x]English       []other:    SUBJECTIVE EXAMINATION     Patient Report/Comments: pt. Reports that she has been keeping up with the ex. Doing the rolls and squeezes several times per day.  The cane ex. Is the hardest and still having some pain with this.     Test used Initial score  2024   Pain Summary VAS 2-5/10    Functional questionnaire Quick DASH 38=53%    Other:ROM L shoulder flexion  43 A  130 P    abduction  48 with hike        OBJECTIVE EXAMINATION     Observation: low irritability, min guarding    Test measurements: see

## 2024-01-23 ENCOUNTER — HOSPITAL ENCOUNTER (OUTPATIENT)
Dept: PHYSICAL THERAPY | Age: 79
Setting detail: THERAPIES SERIES
Discharge: HOME | End: 2024-01-23
Payer: MEDICARE

## 2024-01-23 PROCEDURE — 97110 THERAPEUTIC EXERCISES: CPT | Performed by: PHYSICAL THERAPIST

## 2024-01-23 PROCEDURE — 97140 MANUAL THERAPY 1/> REGIONS: CPT | Performed by: PHYSICAL THERAPIST

## 2024-01-23 NOTE — FLOWSHEET NOTE
of 25% or less for the Quick DASH to assist with return to prior level of function.                 Status: [] Progressing: [] Met: [] Not Met: [] Adjusted  Improve shoulder AROM to 130 jF/Abd, 70-80 ER/IR degrees or  better to allow for proper joint functioning as indicated by patients functional deficits.  Status: [] Progressing: [] Met: [] Not Met: [] Adjusted  Pt to improve strength to show motor control/activation of scapular retractors, shoulder elevators, biceps, and triceps to facilitate functional mobility and ADLs.    Status: [] Progressing: [] Met: [] Not Met: [] Adjusted  Patient will return to Reaching activities, Bathing/Grooming, Dressing, Lifting, and Driving without increased symptoms or restriction to work towards return to prior level of function.                             Status: [] Progressing: [] Met: [] Not Met: [] Adjusted  Patient will increase UE function to allow independence in all self-care activities.                                                                                                           Status: [] Progressing: [] Met: [] Not Met: [] Adjusted    Overall Progression Towards Functional goals/ Treatment Progress Update:  [] Patient is progressing as expected towards functional goals listed.    [] Progression is slowed due to complexities/Impairments listed.  [] Progression has been slowed due to co-morbidities.  [x] Plan just implemented, too soon (<30days) to assess goals progression   [] Goals require adjustment due to lack of progress  [] Patient is not progressing as expected and requires additional follow up with physician  [] Other:     CHARGE CAPTURE     PT CHARGE GRID   CPT Code (TIMED) minutes # CPT Code (UNTIMED) #     Therex (02324)  24 2  EVAL:LOW (97889 - Typically 20 minutes face-to-face)     Neuromusc. Re-ed (12423)    Re-Eval (38176)     Manual (36139) 21 1  Estim Unattended (72432)     Ther. Act (89510)    Mech. Traction (01657)     Gait (91518)    Dry

## 2024-01-30 ENCOUNTER — HOSPITAL ENCOUNTER (OUTPATIENT)
Dept: PHYSICAL THERAPY | Age: 79
Setting detail: THERAPIES SERIES
Discharge: HOME | End: 2024-01-30
Payer: MEDICARE

## 2024-01-30 PROCEDURE — 97110 THERAPEUTIC EXERCISES: CPT | Performed by: PHYSICAL THERAPIST

## 2024-01-30 PROCEDURE — 97140 MANUAL THERAPY 1/> REGIONS: CPT | Performed by: PHYSICAL THERAPIST

## 2024-01-30 NOTE — FLOWSHEET NOTE
care, mobility, lifting and ambulation    TREATMENT PLAN   Plan: Cont POC- Continue emphasis/focus on exercise progression, modulating pain, increasing ROM, and improving soft tissue extensibility. Next visit plan to progress reps, add new exercises, and manual techniques     Electronically Signed by Dalia Adames PT , MSPT, OMT-C [id number]               Date: 01/30/2024     Note: If patient does not return for scheduled/recommended follow up visits, this note will serve as a discharge from care along with the most recent update on progress.

## 2024-02-02 ENCOUNTER — APPOINTMENT (OUTPATIENT)
Dept: PHYSICAL THERAPY | Age: 79
End: 2024-02-02
Payer: MEDICARE

## 2024-02-06 ENCOUNTER — HOSPITAL ENCOUNTER (OUTPATIENT)
Dept: PHYSICAL THERAPY | Age: 79
Setting detail: THERAPIES SERIES
Discharge: HOME | End: 2024-02-06
Payer: MEDICARE

## 2024-02-06 PROCEDURE — 97110 THERAPEUTIC EXERCISES: CPT | Performed by: PHYSICAL THERAPIST

## 2024-02-06 PROCEDURE — 97112 NEUROMUSCULAR REEDUCATION: CPT | Performed by: PHYSICAL THERAPIST

## 2024-02-06 PROCEDURE — 97140 MANUAL THERAPY 1/> REGIONS: CPT | Performed by: PHYSICAL THERAPIST

## 2024-02-06 NOTE — FLOWSHEET NOTE
listed.  [] Progression has been slowed due to co-morbidities.  [x] Plan just implemented, too soon (<30days) to assess goals progression   [] Goals require adjustment due to lack of progress  [] Patient is not progressing as expected and requires additional follow up with physician  [] Other:     CHARGE CAPTURE     PT CHARGE GRID   CPT Code (TIMED) minutes # CPT Code (UNTIMED) #     Therex (99093)  16 1  EVAL:LOW (22248 - Typically 20 minutes face-to-face)     Neuromusc. Re-ed (96713) 10 1  Re-Eval (11439)     Manual (16852) 14 1  Estim Unattended (29556)     Ther. Act (66156)    Mech. Traction (16768)     Gait (57522)    Dry Needle 1-2 muscle (27014)     Aquatic Therex (42645)    Dry Needle 3+ muscle (20561)     Iontophoresis (45812)    VASO (45873)     Ultrasound (80429)    Group Therapy (93807)     Estim Attended (02488)    Canalith Repositioning (31819)     Other:    Other:    Total Timed Code Tx Minutes 40 3       Total Treatment Minutes 40        Charge Justification:  (68422) THERAPEUTIC EXERCISE - Provided verbal/tactile cueing for activities related to strengthening, flexibility, endurance, ROM performed to prevent loss of range of motion, maintain or improve muscular strength or increase flexibility, following either an injury or surgery.   (47725) HOME EXERCISE PROGRAM - Reviewed/Progressed HEP activities related to strengthening, flexibility, endurance, ROM performed to prevent loss of range of motion, maintain or improve muscular strength or increase flexibility, following either an injury or surgery.  (63665) NEUROMUSCULAR RE-EDUCATION - Therapeutic procedure, 1 or more areas, each 15 minutes; neuromuscular reeducation of movement, balance, coordination, kinesthetic sense, posture, and/or proprioception for sitting and/or standing activities  (09209) MANUAL THERAPY -  Manual therapy techniques, 1 or more regions, each 15 minutes (Mobilization/manipulation, manual lymphatic drainage, manual traction)

## 2024-02-08 ENCOUNTER — APPOINTMENT (OUTPATIENT)
Dept: PHYSICAL THERAPY | Age: 79
End: 2024-02-08
Payer: MEDICARE

## 2024-02-12 ENCOUNTER — HOSPITAL ENCOUNTER (OUTPATIENT)
Dept: PHYSICAL THERAPY | Age: 79
Setting detail: THERAPIES SERIES
Discharge: HOME | End: 2024-02-12
Payer: MEDICARE

## 2024-02-12 PROCEDURE — 97140 MANUAL THERAPY 1/> REGIONS: CPT | Performed by: PHYSICAL THERAPIST

## 2024-02-12 PROCEDURE — 97110 THERAPEUTIC EXERCISES: CPT | Performed by: PHYSICAL THERAPIST

## 2024-02-12 NOTE — FLOWSHEET NOTE
Adjusted  Pt to improve strength to show motor control/activation of scapular retractors, shoulder elevators, biceps, and triceps to facilitate functional mobility and ADLs.    Status: [x] Progressing: [] Met: [] Not Met: [] Adjusted  Patient will return to Reaching activities, Bathing/Grooming, Dressing, Lifting, and Driving without increased symptoms or restriction to work towards return to prior level of function.                             Status: [x] Progressing: [] Met: [] Not Met: [] Adjusted  Patient will increase UE function to allow independence in all self-care activities.                                                                                                           Status: [x] Progressing: [] Met: [] Not Met: [] Adjusted    Overall Progression Towards Functional goals/ Treatment Progress Update:  [] Patient is progressing as expected towards functional goals listed.    [x] Progression is slowed due to complexities/Impairments listed.  [] Progression has been slowed due to co-morbidities.  [] Plan just implemented, too soon (<30days) to assess goals progression   [] Goals require adjustment due to lack of progress  [] Patient is not progressing as expected and requires additional follow up with physician  [] Other:     CHARGE CAPTURE     PT CHARGE GRID   CPT Code (TIMED) minutes # CPT Code (UNTIMED) #     Therex (73209)  25 2  EVAL:LOW (50797 - Typically 20 minutes face-to-face)     Neuromusc. Re-ed (16131)    Re-Eval (56150)     Manual (66403) 15 1  Estim Unattended (79374)     Ther. Act (82974)    [facility]. Traction (08692)     Gait (76630)    Dry Needle 1-2 muscle (36753)     Aquatic Therex (32378)    Dry Needle 3+ muscle (20561)     Iontophoresis (81357)    VASO (19393)     Ultrasound (44983)    Group Therapy (05617)     Estim Attended (23998)    Canalith Repositioning (53943)     Other:    Other:    Total Timed Code Tx Minutes 40 3       Total Treatment Minutes 40        Charge

## 2024-02-16 ENCOUNTER — TRANSCRIBE ORDERS (OUTPATIENT)
Dept: ADMINISTRATIVE | Age: 79
End: 2024-02-16

## 2024-02-16 ENCOUNTER — HOSPITAL ENCOUNTER (OUTPATIENT)
Dept: PHYSICAL THERAPY | Age: 79
Setting detail: THERAPIES SERIES
Discharge: HOME | End: 2024-02-16
Payer: MEDICARE

## 2024-02-16 DIAGNOSIS — Z78.0 POSTMENOPAUSAL: Primary | ICD-10-CM

## 2024-02-16 PROCEDURE — 97110 THERAPEUTIC EXERCISES: CPT | Performed by: PHYSICAL THERAPIST

## 2024-02-16 PROCEDURE — 97140 MANUAL THERAPY 1/> REGIONS: CPT | Performed by: PHYSICAL THERAPIST

## 2024-02-16 NOTE — FLOWSHEET NOTE
related to strengthening, flexibility, endurance, ROM performed to prevent loss of range of motion, maintain or improve muscular strength or increase flexibility, following either an injury or surgery.  (18366) NEUROMUSCULAR RE-EDUCATION - Therapeutic procedure, 1 or more areas, each 15 minutes; neuromuscular reeducation of movement, balance, coordination, kinesthetic sense, posture, and/or proprioception for sitting and/or standing activities  (07527) MANUAL THERAPY -  Manual therapy techniques, 1 or more regions, each 15 minutes (Mobilization/manipulation, manual lymphatic drainage, manual traction) for the purpose of modulating pain, promoting relaxation,  increasing ROM, reducing/eliminating soft tissue swelling/inflammation/restriction, improving soft tissue extensibility and allowing for proper ROM for normal function with self care, mobility, lifting and ambulation    TREATMENT PLAN   Plan: Cont POC- Continue emphasis/focus on exercise progression, modulating pain, increasing ROM, and improving soft tissue extensibility. Next visit plan to progress reps, add new exercises, and continue manual techniques     Electronically Signed by Dalia Adames PT , MSPT, OMT-C [id number]               Date: 02/16/2024     Note: If patient does not return for scheduled/recommended follow up visits, this note will serve as a discharge from care along with the most recent update on progress.

## 2024-02-20 ENCOUNTER — HOSPITAL ENCOUNTER (OUTPATIENT)
Dept: PHYSICAL THERAPY | Age: 79
Setting detail: THERAPIES SERIES
Discharge: HOME | End: 2024-02-20
Payer: MEDICARE

## 2024-02-20 PROCEDURE — 97140 MANUAL THERAPY 1/> REGIONS: CPT | Performed by: PHYSICAL THERAPIST

## 2024-02-20 PROCEDURE — 97110 THERAPEUTIC EXERCISES: CPT | Performed by: PHYSICAL THERAPIST

## 2024-02-20 NOTE — FLOWSHEET NOTE
[facility]- Outpatient Rehabilitation and Therapy  [address], [place] [address] office: [phone] fax: [phone]    Physical Therapy: TREATMENT/PROGRESS NOTE   Patient: Marli Lewis (79 y.o. female)   Treatment Date: 2024   :  1945 MRN: [id number]   Visit #: 8  Insurance Allowable Auth Needed   24 24-3/29/24 [x]Yes    []No    Insurance: Payor: HU[employer]NA MEDICARE / Plan: HU[employer]NA CHOICE-PPO MEDICARE / Product Type: *No Product type* /   Insurance ID: [id number] - (Medicare Managed)  Secondary Insurance (if applicable):    Treatment Diagnosis:   Left shoulder pain M25.512    Medical Diagnosis:    Closed fracture of proximal end of left humerus, unspecified fracture morphology, initial encounter [S4.]   Referring Physician: Kalen Villegas MD  PCP: Ann Mondragon APRN - [facility]                             Plan of care signed (Y/N):     Date of Patient follow up with Physician:      Progress Report/POC: NO  POC update due: (10 visits /OR AUTH LIMITS, whichever is less)  3/12/2024     Precautions/ Contra-indications:                                                                                          Latex allergy:  NO  Pacemaker:    NO  Contraindications for Manipulation: recent L proximal humerus fx  Date of Surgery:   Other: per MD note:no heavy impact activities, and gentle ROM.      Red Flags:  None    Preferred Language for Healthcare:   [x]English       []other:    SUBJECTIVE EXAMINATION     Patient Report/Comments: pt. Reports that she has been a little     Test used Initial score  2024   Pain Summary VAS 2-5/10 1-2/10 1-2/10   Functional questionnaire Quick DASH 38=53%     Other:ROM L shoulder flexion  43 A  130 P 145  150 supine   abduction  48 with hike 160 P 160-165 supine   ER @45   72 75   IR @45   To abdomen To abdomen   MMT-shoulder flexion    3+   Shoulder ER    3+              OBJECTIVE EXAMINATION     Observation:

## 2024-02-22 ENCOUNTER — HOSPITAL ENCOUNTER (OUTPATIENT)
Dept: WOMENS IMAGING | Age: 79
Discharge: HOME | End: 2024-02-22
Payer: MEDICARE

## 2024-02-22 DIAGNOSIS — Z78.0 POSTMENOPAUSAL: ICD-10-CM

## 2024-02-22 PROCEDURE — 77080 DXA BONE DENSITY AXIAL: CPT

## 2024-02-27 ENCOUNTER — HOSPITAL ENCOUNTER (OUTPATIENT)
Dept: PHYSICAL THERAPY | Age: 79
Setting detail: THERAPIES SERIES
Discharge: HOME | End: 2024-02-27
Payer: MEDICARE

## 2024-02-27 ENCOUNTER — APPOINTMENT (OUTPATIENT)
Dept: PHYSICAL THERAPY | Age: 79
End: 2024-02-27
Payer: MEDICARE

## 2024-02-27 PROCEDURE — 97140 MANUAL THERAPY 1/> REGIONS: CPT | Performed by: PHYSICAL THERAPIST

## 2024-02-27 PROCEDURE — 97110 THERAPEUTIC EXERCISES: CPT | Performed by: PHYSICAL THERAPIST

## 2024-02-27 PROCEDURE — 97112 NEUROMUSCULAR REEDUCATION: CPT | Performed by: PHYSICAL THERAPIST

## 2024-02-27 NOTE — FLOWSHEET NOTE
[facility]- Outpatient Rehabilitation and Therapy  [address], [place] [address] office: [phone] fax: [phone]    Physical Therapy: TREATMENT/PROGRESS NOTE   Patient: Marli Lewis (79 y.o. female)   Treatment Date: 2024   :  1945 MRN: [id number]   Visit #: 9  Insurance Allowable Auth Needed   24 24-3/29/24 [x]Yes    []No    Insurance: Payor: HUMANA MEDICARE / Plan: HUMANA CHOICE-PPO MEDICARE / Product Type: *No Product type* /   Insurance ID: [id number] - (Medicare Managed)  Secondary Insurance (if applicable):    Treatment Diagnosis:   Left shoulder pain M25.512    Medical Diagnosis:    Closed fracture of proximal end of left humerus, unspecified fracture morphology, initial encounter [S4.]   Referring Physician: Kalen Villegas MD  PCP: Ann Mondragon APRN - [place]                             Plan of care signed (Y/N):     Date of Patient follow up with Physician:      Progress Report/POC: NO  POC update due: (10 visits /OR AUTH LIMITS, whichever is less)  3/12/2024     Precautions/ Contra-indications:                                                                                          Latex allergy:  NO  Pacemaker:    NO  Contraindications for Manipulation: recent L proximal humerus fx  Date of Surgery:   Other: per MD note:no heavy impact activities, and gentle ROM.      Red Flags:  None    Preferred Language for Healthcare:   [x]English       []other:    SUBJECTIVE EXAMINATION     Patient Report/Comments: pt. Reports that she has been a little stiff and sore, but better than she was, \"I just know it's there\".     Test used Initial score  2024   Pain Summary VAS 2-5/10 1-2/10 1-2/10   Functional questionnaire Quick DASH 38=53%     Other:ROM L shoulder flexion  43 A  130 P 145  150 supine   abduction  48 with hike 160 P 160-165 supine   ER @45   72 75   IR @45   To abdomen To abdomen   MMT-shoulder flexion    3+

## 2024-03-01 ENCOUNTER — HOSPITAL ENCOUNTER (OUTPATIENT)
Dept: PHYSICAL THERAPY | Age: 79
Setting detail: THERAPIES SERIES
Discharge: HOME | End: 2024-03-01
Payer: MEDICARE

## 2024-03-01 PROCEDURE — 97140 MANUAL THERAPY 1/> REGIONS: CPT | Performed by: PHYSICAL THERAPIST

## 2024-03-01 PROCEDURE — 97110 THERAPEUTIC EXERCISES: CPT | Performed by: PHYSICAL THERAPIST

## 2024-03-01 NOTE — FLOWSHEET NOTE
Dry Needle 3+ muscle (77701)     Iontophoresis (54504)    VASO (77352)     Ultrasound (98539)    Group Therapy (60455)     Estim Attended (82955)    Canalith Repositioning (23113)     Other:    Other:    Total Timed Code Tx Minutes 40 3       Total Treatment Minutes 50(independent ex warm up)        Charge Justification:  (75463) THERAPEUTIC EXERCISE - Provided verbal/tactile cueing for activities related to strengthening, flexibility, endurance, ROM performed to prevent loss of range of motion, maintain or improve muscular strength or increase flexibility, following either an injury or surgery.   (57620) MANUAL THERAPY -  Manual therapy techniques, 1 or more regions, each 15 minutes (Mobilization/manipulation, manual lymphatic drainage, manual traction) for the purpose of modulating pain, promoting relaxation,  increasing ROM, reducing/eliminating soft tissue swelling/inflammation/restriction, improving soft tissue extensibility and allowing for proper ROM for normal function with self care, mobility, lifting and ambulation    TREATMENT PLAN   Plan: Cont POC- Continue emphasis/focus on exercise progression, modulating pain, increasing ROM, and improving soft tissue extensibility. Next visit plan to progress reps, add new exercises, and continue manual techniques/add PNF and closed chain ex.     Electronically Signed by Dalia Adames PT , MSPT, OMT-C [id number]               Date: 03/01/2024     Note: If patient does not return for scheduled/recommended follow up visits, this note will serve as a discharge from care along with the most recent update on progress.

## 2024-03-04 ENCOUNTER — HOSPITAL ENCOUNTER (OUTPATIENT)
Dept: PHYSICAL THERAPY | Age: 79
Setting detail: THERAPIES SERIES
Discharge: HOME | End: 2024-03-04
Payer: MEDICARE

## 2024-03-04 PROCEDURE — 97110 THERAPEUTIC EXERCISES: CPT | Performed by: PHYSICAL THERAPIST

## 2024-03-04 PROCEDURE — 97140 MANUAL THERAPY 1/> REGIONS: CPT | Performed by: PHYSICAL THERAPIST

## 2024-03-04 NOTE — FLOWSHEET NOTE
70-80 ER/IR degrees or  better to allow for proper joint functioning as indicated by patients functional deficits.  Status: [x] Progressing: [] Met: [] Not Met: [] Adjusted  Pt to improve strength to show motor control/activation of scapular retractors, shoulder elevators, biceps, and triceps to facilitate functional mobility and ADLs.    Status: [x] Progressing: [] Met: [] Not Met: [] Adjusted  Patient will return to Reaching activities, Bathing/Grooming, Dressing, Lifting, and Driving without increased symptoms or restriction to work towards return to prior level of function.                             Status: [x] Progressing: [] Met: [] Not Met: [] Adjusted  Patient will increase UE function to allow independence in all self-care activities.                                                                                                           Status: [x] Progressing: [] Met: [] Not Met: [] Adjusted    Overall Progression Towards Functional goals/ Treatment Progress Update:  [] Patient is progressing as expected towards functional goals listed.    [x] Progression is slowed due to complexities/Impairments listed.  [] Progression has been slowed due to co-morbidities.  [] Plan just implemented, too soon (<30days) to assess goals progression   [] Goals require adjustment due to lack of progress  [] Patient is not progressing as expected and requires additional follow up with physician  [] Other:     CHARGE CAPTURE     PT CHARGE GRID   CPT Code (TIMED) minutes # CPT Code (UNTIMED) #     Therex (72502)  33 2  EVAL:LOW (61461 - Typically 20 minutes face-to-face)     Neuromusc. Re-ed (68961)    Re-Eval (95395)     Manual (02802) 12 1  Estim Unattended (24886)     Ther. Act (57182)    [facility]. Traction (76265)     Gait (89771)    Dry Needle 1-2 muscle (36578)     Aquatic Therex (75906)    Dry Needle 3+ muscle (13543)     Iontophoresis (38924)    VASO (14429)     Ultrasound (55002)    Group Therapy (69944)     Estim

## 2024-03-07 ENCOUNTER — APPOINTMENT (OUTPATIENT)
Dept: PHYSICAL THERAPY | Age: 79
End: 2024-03-07
Payer: MEDICARE

## 2024-03-11 ENCOUNTER — HOSPITAL ENCOUNTER (OUTPATIENT)
Dept: PHYSICAL THERAPY | Age: 79
Setting detail: THERAPIES SERIES
Discharge: HOME | End: 2024-03-11
Payer: MEDICARE

## 2024-03-11 PROCEDURE — 97110 THERAPEUTIC EXERCISES: CPT | Performed by: PHYSICAL THERAPIST

## 2024-03-11 PROCEDURE — 97140 MANUAL THERAPY 1/> REGIONS: CPT | Performed by: PHYSICAL THERAPIST

## 2024-03-11 NOTE — FLOWSHEET NOTE
(30125)    Dry Needle 3+ muscle (99756)     Iontophoresis (53541)    VASO (56030)     Ultrasound (77741)    Group Therapy (12764)     Estim Attended (32292)    Canalith Repositioning (44563)     Other:    Other:    Total Timed Code Tx Minutes 38 3       Total Treatment Minutes 45        Charge Justification:  (90842) THERAPEUTIC EXERCISE - Provided verbal/tactile cueing for activities related to strengthening, flexibility, endurance, ROM performed to prevent loss of range of motion, maintain or improve muscular strength or increase flexibility, following either an injury or surgery.   (14138) MANUAL THERAPY -  Manual therapy techniques, 1 or more regions, each 15 minutes (Mobilization/manipulation, manual lymphatic drainage, manual traction) for the purpose of modulating pain, promoting relaxation,  increasing ROM, reducing/eliminating soft tissue swelling/inflammation/restriction, improving soft tissue extensibility and allowing for proper ROM for normal function with self care, mobility, lifting and ambulation    TREATMENT PLAN   Plan: Cont POC- Continue emphasis/focus on exercise progression, modulating pain, increasing ROM, and improving soft tissue extensibility. Next visit plan to progress reps, add new exercises, and continue manual techniques/add PNF and closed chain ex.     Electronically Signed by Dalia Adames PT , MSPT, OMT-C [id number]               Date: 03/11/2024     Note: If patient does not return for scheduled/recommended follow up visits, this note will serve as a discharge from care along with the most recent update on progress.

## 2024-03-12 ENCOUNTER — OFFICE VISIT (OUTPATIENT)
Dept: ORTHOPEDIC SURGERY | Age: 79
End: 2024-03-12
Payer: MEDICARE

## 2024-03-12 DIAGNOSIS — S42.202A CLOSED FRACTURE OF PROXIMAL END OF LEFT HUMERUS, UNSPECIFIED FRACTURE MORPHOLOGY, INITIAL ENCOUNTER: Primary | ICD-10-CM

## 2024-03-12 PROCEDURE — 99213 OFFICE O/P EST LOW 20 MIN: CPT | Performed by: ORTHOPAEDIC SURGERY

## 2024-03-12 PROCEDURE — 1036F TOBACCO NON-USER: CPT | Performed by: ORTHOPAEDIC SURGERY

## 2024-03-12 PROCEDURE — 1090F PRES/ABSN URINE INCON ASSESS: CPT | Performed by: ORTHOPAEDIC SURGERY

## 2024-03-12 PROCEDURE — G8484 FLU IMMUNIZE NO ADMIN: HCPCS | Performed by: ORTHOPAEDIC SURGERY

## 2024-03-12 PROCEDURE — G8427 DOCREV CUR MEDS BY ELIG CLIN: HCPCS | Performed by: ORTHOPAEDIC SURGERY

## 2024-03-12 PROCEDURE — G8399 PT W/DXA RESULTS DOCUMENT: HCPCS | Performed by: ORTHOPAEDIC SURGERY

## 2024-03-12 PROCEDURE — G8419 CALC BMI OUT NRM PARAM NOF/U: HCPCS | Performed by: ORTHOPAEDIC SURGERY

## 2024-03-12 PROCEDURE — 1123F ACP DISCUSS/DSCN MKR DOCD: CPT | Performed by: ORTHOPAEDIC SURGERY

## 2024-03-12 NOTE — PROGRESS NOTES
the office 11/28/2023, 3 views of the right ankle, and showed no fracture. No other abnormality.     IMPRESSION:   1- Left shoulder pain/ minimally displaced proximal humerus fracture.  2- Right ankle pain/ Ankle ATF sprain.  3- Left long finger volar mass, possible ganglion cyst.    PLAN:    I assured the patient that the xray is negative for acute fracture. The xray findings were reviewed with the patient and explained to her that the pain is 2ry to ankle sprain, and that it should continue to improve the next 3-4 weeks.  She can be WBAT in a brace, applied today and avoid heavy impact acitivity and work on peroneal muscle strength.    I discussed that the overall alignment of this fracture is still good and that we can try to treat this non-operatively in a sling left shoulder, with no heavy impact activities, and gentle ROM.  We discussed the risk of nonunion and or malunion.  We will see her  back in 2 months at which time we will get a new xray of the left shoulder.         Kalen Villegas MD

## 2024-03-13 ENCOUNTER — HOSPITAL ENCOUNTER (OUTPATIENT)
Dept: PHYSICAL THERAPY | Age: 79
Setting detail: THERAPIES SERIES
Discharge: HOME | End: 2024-03-13
Payer: MEDICARE

## 2024-03-13 PROCEDURE — 97140 MANUAL THERAPY 1/> REGIONS: CPT | Performed by: PHYSICAL THERAPIST

## 2024-03-13 PROCEDURE — 97110 THERAPEUTIC EXERCISES: CPT | Performed by: PHYSICAL THERAPIST

## 2024-03-13 PROCEDURE — 97112 NEUROMUSCULAR REEDUCATION: CPT | Performed by: PHYSICAL THERAPIST

## 2024-03-14 ENCOUNTER — APPOINTMENT (OUTPATIENT)
Dept: PHYSICAL THERAPY | Age: 79
End: 2024-03-14
Payer: MEDICARE

## 2024-03-18 ENCOUNTER — HOSPITAL ENCOUNTER (OUTPATIENT)
Dept: PHYSICAL THERAPY | Age: 79
Setting detail: THERAPIES SERIES
Discharge: HOME | End: 2024-03-18
Payer: MEDICARE

## 2024-03-18 PROCEDURE — 97110 THERAPEUTIC EXERCISES: CPT | Performed by: PHYSICAL THERAPIST

## 2024-03-18 PROCEDURE — 97112 NEUROMUSCULAR REEDUCATION: CPT | Performed by: PHYSICAL THERAPIST

## 2024-03-18 PROCEDURE — 97140 MANUAL THERAPY 1/> REGIONS: CPT | Performed by: PHYSICAL THERAPIST

## 2024-03-18 NOTE — FLOWSHEET NOTE
[facility]- Outpatient Rehabilitation and Therapy  [address], [place] [address] office: [phone] fax: [phone]    Physical Therapy: TREATMENT/PROGRESS NOTE   Patient: Marli Lewis (79 y.o. female)   Treatment Date: 2024   :  1945 MRN: [id number]   Visit #: 14  Insurance Allowable Auth Needed   24 24-3/29/24 [x]Yes    []No    Insurance: Payor: HUMANA MEDICARE / Plan: HUMANA CHOICE-PPO MEDICARE / Product Type: *No Product type* /   Insurance ID: [id number] - (Medicare Managed)  Secondary Insurance (if applicable):    Treatment Diagnosis:   Left shoulder pain M25.512    Medical Diagnosis:    Closed fracture of proximal end of left humerus, unspecified fracture morphology, initial encounter [S4]   Referring Physician: Kalen Villegas MD  PCP: Ann Mondragon APRN - [place]                             Plan of care signed (Y/N):     Date of Patient follow up with Physician:      Progress Report/POC: NO   POC update due: (10 visits /OR AUTH LIMITS, whichever is less)  3/12/2024     Precautions/ Contra-indications:                                                                                          Latex allergy:  NO  Pacemaker:    NO  Contraindications for Manipulation: recent L proximal humerus fx  Date of Surgery:   Other: per MD note:no heavy impact activities, and gentle ROM.      Red Flags:  None    Preferred Language for Healthcare:   [x]English       []other:    SUBJECTIVE EXAMINATION     Patient Report/Comments: pt. Reports that she was able to unhook her bra last night.  Stiff this morning from sleeping on the L side.     Test used Initial score  1/12/24 2024 2/12/24 3/13/24   Pain Summary VAS 2-5/10 1-2/10 1-210 0/10   Functional questionnaire Quick DASH 38=53%      Other:ROM L shoulder flexion  43 A  130 P 145  150 supine 145 supine P  130 AROM   abduction  48 with hike 160 P 160-165 supine 165 supine   ER @45   72 75 80   IR @45

## 2024-03-21 ENCOUNTER — HOSPITAL ENCOUNTER (OUTPATIENT)
Dept: PHYSICAL THERAPY | Age: 79
Setting detail: THERAPIES SERIES
Discharge: HOME | End: 2024-03-21
Payer: MEDICARE

## 2024-03-21 PROCEDURE — 97110 THERAPEUTIC EXERCISES: CPT | Performed by: PHYSICAL THERAPIST

## 2024-03-21 PROCEDURE — 97140 MANUAL THERAPY 1/> REGIONS: CPT | Performed by: PHYSICAL THERAPIST

## 2024-03-21 PROCEDURE — 97112 NEUROMUSCULAR REEDUCATION: CPT | Performed by: PHYSICAL THERAPIST

## 2024-03-21 NOTE — FLOWSHEET NOTE
[facility]- Outpatient Rehabilitation and Therapy  [address], [place] [address] office: [phone] fax: [phone]    Physical Therapy: TREATMENT/PROGRESS NOTE   Patient: Marli Lewis (79 y.o. female)   Treatment Date: 2024   :  1945 MRN: [id number]   Visit #: 15  Insurance Allowable Auth Needed   24 24-3/29/24 [x]Yes    []No    Insurance: Payor: HUMANA MEDICARE / Plan: HUMANA CHOICE-PPO MEDICARE / Product Type: *No Product type* /   Insurance ID: [id number] - (Medicare Managed)  Secondary Insurance (if applicable):    Treatment Diagnosis:   Left shoulder pain M25.512    Medical Diagnosis:    Closed fracture of proximal end of left humerus, unspecified fracture morphology, initial encounter [S4.]   Referring Physician: Kalen Villegas MD  PCP: Ann Mondragon APRN - [place]                             Plan of care signed (Y/N):     Date of Patient follow up with Physician:      Progress Report/POC: NO   POC update due: (10 visits /OR AUTH LIMITS, whichever is less)  2024     Precautions/ Contra-indications:                                                                                          Latex allergy:  NO  Pacemaker:    NO  Contraindications for Manipulation: recent L proximal humerus fx  Date of Surgery:   Other: per MD note:no heavy impact activities, and gentle ROM.      Red Flags:  None    Preferred Language for Healthcare:   [x]English       []other:    SUBJECTIVE EXAMINATION     Patient Report/Comments: pt. Reports that she still cannot lift a bowl into the cabinet above shoulder height.   Test used Initial score  1/12/24 2024 2/12/24 3/13/24   Pain Summary VAS 2-5/10 1-210 1-210 0/10   Functional questionnaire Quick DASH 38=53%      Other:ROM L shoulder flexion  43 A  130 P 145  150 supine 145 supine P  130 AROM   abduction  48 with hike 160 P 160-165 supine 165 supine   ER @45   72 75 80   IR @45   To abdomen To abdomen 80

## 2024-03-25 ENCOUNTER — APPOINTMENT (OUTPATIENT)
Dept: PHYSICAL THERAPY | Age: 79
End: 2024-03-25
Payer: MEDICARE

## 2024-03-25 ENCOUNTER — HOSPITAL ENCOUNTER (OUTPATIENT)
Dept: PHYSICAL THERAPY | Age: 79
Setting detail: THERAPIES SERIES
Discharge: HOME | End: 2024-03-25
Payer: MEDICARE

## 2024-03-25 PROCEDURE — 97140 MANUAL THERAPY 1/> REGIONS: CPT | Performed by: PHYSICAL THERAPIST

## 2024-03-25 PROCEDURE — 97110 THERAPEUTIC EXERCISES: CPT | Performed by: PHYSICAL THERAPIST

## 2024-03-25 NOTE — FLOWSHEET NOTE
evaluation and advanced clinical decision from a Physical Therapist to address and improve ROM, muscle strength, neuromuscular control, functional mobility, and ADL status to safely return to PLOF, ADLs/IADLs, and recreational activity without symptoms or restrictions.     Medical Necessity Documentation:  I certify that this patient meets the below criteria necessary for medical necessity for care and/or justification of therapy services:  The patient has functional impairments and/or activity limitations and would benefit from continued outpatient therapy services to address the deficits outlined in the patients goals  The patient has a musculoskeletal condition(s) with a corresponding ICD-10 code that is of complexity and severity that require skilled therapeutic intervention. This has a direct and significant impact on the need for therapy and significantly impacts the rate of recovery.     Treatment/Activity Tolerance:  [x] Patient tolerated treatment well [] Patient limited by fatique  [] Patient limited by pain  [] Patient limited by other medical complications  [] Other:     Return to Play: NA    Prognosis for POC: [x] Good [] Fair  [] Poor    Patient requires continued skilled intervention: [x] Yes  [] No      GOALS     Patient stated goal: For L arm to be functional  Status: [x] Progressing: [] Met: [] Not Met: [] Adjusted     Therapist goals for Patient:   Short Term Goals: To be achieved in: 2 weeks  Independent in HEP and progression per patient tolerance, in order to progress toward full function and prevent re-injury.               Status: [] Progressing: [x] Met: [] Not Met: [] Adjusted  Patient will have a decrease in pain to 2/10 to help  facilitate improvement in movement, function, and ADLs as indicated by functional deficits.              Status: [] Progressing: [x] Met: [] Not Met: [] Adjusted     Long Term Goals: To be achieved in: 12weeks  Disability index score of 25% or less for the Quick

## 2024-03-28 ENCOUNTER — HOSPITAL ENCOUNTER (OUTPATIENT)
Dept: PHYSICAL THERAPY | Age: 79
Setting detail: THERAPIES SERIES
Discharge: HOME | End: 2024-03-28
Payer: MEDICARE

## 2024-03-28 PROCEDURE — 97110 THERAPEUTIC EXERCISES: CPT | Performed by: PHYSICAL THERAPIST

## 2024-03-28 PROCEDURE — 97140 MANUAL THERAPY 1/> REGIONS: CPT | Performed by: PHYSICAL THERAPIST

## 2024-03-28 NOTE — FLOWSHEET NOTE
[facility]- Outpatient Rehabilitation and Therapy  [address], [place] [address] office: [phone] fax: [phone]    Physical Therapy: TREATMENT/PROGRESS NOTE   Patient: Marli Lewis (79 y.o. female)   Treatment Date: 2024   :  1945 MRN: [id number]   Visit #: 17  Insurance Allowable Auth Needed   24 24-3/29/24 [x]Yes    []No    Insurance: Payor: HU[employer]NA MEDICARE / Plan: HU[employer]NA CHOICE-PPO MEDICARE / Product Type: *No Product type* /   Insurance ID: [id number] - (Medicare Managed)  Secondary Insurance (if applicable):    Treatment Diagnosis:   Left shoulder pain M25.512    Medical Diagnosis:    Closed fracture of proximal end of left humerus, unspecified fracture morphology, initial encounter [S4]   Referring Physician: Kalen Villegas MD  PCP: Ann Mondragon APRN - [facility]                             Plan of care signed (Y/N):     Date of Patient follow up with Physician:      Progress Report/POC: NO   POC update due: (10 visits /OR AUTH LIMITS, whichever is less)  2024     Precautions/ Contra-indications:                                                                                          Latex allergy:  NO  Pacemaker:    NO  Contraindications for Manipulation: recent L proximal humerus fx  Date of Surgery:   Other: per MD note:no heavy impact activities, and gentle ROM.      Red Flags:  None    Preferred Language for Healthcare:   [x]English       []other:    SUBJECTIVE EXAMINATION     Patient Report/Comments: pt. Reports that she is a little stiff and sore.   Test used Initial score  1/12/24 2024 2/12/24 3/13/24   Pain Summary VAS 2-5/10 1-210 1-2/10 0/10   Functional questionnaire Quick DASH 38=53%      Other:ROM L shoulder flexion  43 A  130 P 145  150 supine 145 supine P  130 AROM   abduction  48 with hike 160 P 160-165 supine 165 supine   ER @45   72 75 80   IR @45   To abdomen To abdomen 80   MMT-shoulder flexion    3+ 4-

## 2024-03-29 ENCOUNTER — APPOINTMENT (OUTPATIENT)
Dept: PHYSICAL THERAPY | Age: 79
End: 2024-03-29
Payer: MEDICARE

## 2024-04-01 ENCOUNTER — HOSPITAL ENCOUNTER (OUTPATIENT)
Dept: PHYSICAL THERAPY | Age: 79
Setting detail: THERAPIES SERIES
Discharge: HOME | End: 2024-04-01
Payer: MEDICARE

## 2024-04-01 PROCEDURE — 97110 THERAPEUTIC EXERCISES: CPT | Performed by: PHYSICAL THERAPIST

## 2024-04-01 PROCEDURE — 97140 MANUAL THERAPY 1/> REGIONS: CPT | Performed by: PHYSICAL THERAPIST

## 2024-04-01 NOTE — FLOWSHEET NOTE
[facility]- Outpatient Rehabilitation and Therapy  [address], [place] [address] office: [phone] fax: [phone]    Physical Therapy: TREATMENT/PROGRESS NOTE   Patient: Marli Leiws (79 y.o. female)   Treatment Date: 2024   :  1945 MRN: [id number]   Visit #: 18  Insurance Allowable Auth Needed   24 24-3/29/24 [x]Yes    []No    Insurance: Payor: HU[employer]NA MEDICARE / Plan: HUMANA CHOICE-PPO MEDICARE / Product Type: *No Product type* /   Insurance ID: [id number] - (Medicare Managed)  Secondary Insurance (if applicable):    Treatment Diagnosis:   Left shoulder pain M25.512    Medical Diagnosis:    Closed fracture of proximal end of left humerus, unspecified fracture morphology, initial encounter [S4]   Referring Physician: Kalen Villegas MD  PCP: Ann Mondragon APRN - [facility]                             Plan of care signed (Y/N):     Date of Patient follow up with Physician:      Progress Report/POC: NO   POC update due: (10 visits /OR AUTH LIMITS, whichever is less)  2024     Precautions/ Contra-indications:                                                                                          Latex allergy:  NO  Pacemaker:    NO  Contraindications for Manipulation: recent L proximal humerus fx  Date of Surgery:   Other: per MD note:no heavy impact activities, and gentle ROM.      Red Flags:  None    Preferred Language for Healthcare:   [x]English       []other:    SUBJECTIVE EXAMINATION     Patient Report/Comments: pt. Reports that she is a little stiff and sore.   Test used Initial score  1/12/24 2024 2/12/24 3/13/24   Pain Summary VAS 2-5/10 1-210 1-2/10 0/10   Functional questionnaire Quick DASH 38=53%      Other:ROM L shoulder flexion  43 A  130 P 145  150 supine 145 supine P  130 AROM   abduction  48 with hike 160 P 160-165 supine 165 supine   ER @45   72 75 80   IR @45   To abdomen To abdomen 80   MMT-shoulder flexion    3+ 4-

## 2024-04-04 ENCOUNTER — HOSPITAL ENCOUNTER (OUTPATIENT)
Dept: PHYSICAL THERAPY | Age: 79
Setting detail: THERAPIES SERIES
Discharge: HOME | End: 2024-04-04
Payer: MEDICARE

## 2024-04-04 PROCEDURE — 97140 MANUAL THERAPY 1/> REGIONS: CPT | Performed by: PHYSICAL THERAPIST

## 2024-04-04 PROCEDURE — 97110 THERAPEUTIC EXERCISES: CPT | Performed by: PHYSICAL THERAPIST

## 2024-04-04 NOTE — FLOWSHEET NOTE
Provided verbal/tactile cueing for activities related to strengthening, flexibility, endurance, ROM performed to prevent loss of range of motion, maintain or improve muscular strength or increase flexibility, following either an injury or surgery.   (67527) MANUAL THERAPY -  Manual therapy techniques, 1 or more regions, each 15 minutes (Mobilization/manipulation, manual lymphatic drainage, manual traction) for the purpose of modulating pain, promoting relaxation,  increasing ROM, reducing/eliminating soft tissue swelling/inflammation/restriction, improving soft tissue extensibility and allowing for proper ROM for normal function with self care, mobility, lifting and ambulation    TREATMENT PLAN   Plan: Cont POC- Continue emphasis/focus on exercise progression, increasing ROM, and improving soft tissue extensibility. Next visit plan to continue current phase   Cont. To progress with AROM against gravity as gary. Without compensatory hiking.  Electronically Signed by Dalia Adames PT , MSPT, OMT-C [id number]               Date: 04/04/2024     Note: If patient does not return for scheduled/recommended follow up visits, this note will serve as a discharge from care along with the most recent update on progress.

## 2024-04-08 ENCOUNTER — HOSPITAL ENCOUNTER (OUTPATIENT)
Dept: PHYSICAL THERAPY | Age: 79
Setting detail: THERAPIES SERIES
Discharge: HOME | End: 2024-04-08
Payer: MEDICARE

## 2024-04-08 PROCEDURE — 97110 THERAPEUTIC EXERCISES: CPT | Performed by: PHYSICAL THERAPIST

## 2024-04-08 PROCEDURE — 97140 MANUAL THERAPY 1/> REGIONS: CPT | Performed by: PHYSICAL THERAPIST

## 2024-04-08 NOTE — FLOWSHEET NOTE
Provided verbal/tactile cueing for activities related to strengthening, flexibility, endurance, ROM performed to prevent loss of range of motion, maintain or improve muscular strength or increase flexibility, following either an injury or surgery.   (46828) HOME EXERCISE PROGRAM - Reviewed/Progressed HEP activities related to strengthening, flexibility, endurance, ROM performed to prevent loss of range of motion, maintain or improve muscular strength or increase flexibility, following either an injury or surgery.  (22173) MANUAL THERAPY -  Manual therapy techniques, 1 or more regions, each 15 minutes (Mobilization/manipulation, manual lymphatic drainage, manual traction) for the purpose of modulating pain, promoting relaxation,  increasing ROM, reducing/eliminating soft tissue swelling/inflammation/restriction, improving soft tissue extensibility and allowing for proper ROM for normal function with self care, mobility, lifting and ambulation    TREATMENT PLAN   Plan: Cont POC- Continue emphasis/focus on exercise progression, increasing ROM, and improving soft tissue extensibility. Next visit plan to continue current phase   Cont. To progress with AROM against gravity as gary. Without compensatory hiking.  Electronically Signed by Dalia Adames PT , MSPT, OMT-C [id number]               Date: 04/08/2024     Note: If patient does not return for scheduled/recommended follow up visits, this note will serve as a discharge from care along with the most recent update on progress.

## 2024-04-11 ENCOUNTER — APPOINTMENT (OUTPATIENT)
Dept: PHYSICAL THERAPY | Age: 79
End: 2024-04-11
Payer: MEDICARE

## 2024-04-12 ENCOUNTER — HOSPITAL ENCOUNTER (OUTPATIENT)
Dept: PHYSICAL THERAPY | Age: 79
Setting detail: THERAPIES SERIES
Discharge: HOME | End: 2024-04-12
Payer: MEDICARE

## 2024-04-12 PROCEDURE — 97140 MANUAL THERAPY 1/> REGIONS: CPT | Performed by: PHYSICAL THERAPIST

## 2024-04-12 PROCEDURE — 97110 THERAPEUTIC EXERCISES: CPT | Performed by: PHYSICAL THERAPIST

## 2024-04-12 NOTE — FLOWSHEET NOTE
Therapy (97114)     Estim Attended (50831)    Canalith Repositioning (66459)     Other:    Other:    Total Timed Code Tx Minutes 40 3       Total Treatment Minutes 40        Charge Justification:  (51757) THERAPEUTIC EXERCISE - Provided verbal/tactile cueing for activities related to strengthening, flexibility, endurance, ROM performed to prevent loss of range of motion, maintain or improve muscular strength or increase flexibility, following either an injury or surgery.   (06107) HOME EXERCISE PROGRAM - Reviewed/Progressed HEP activities related to strengthening, flexibility, endurance, ROM performed to prevent loss of range of motion, maintain or improve muscular strength or increase flexibility, following either an injury or surgery.  (20902) MANUAL THERAPY -  Manual therapy techniques, 1 or more regions, each 15 minutes (Mobilization/manipulation, manual lymphatic drainage, manual traction) for the purpose of modulating pain, promoting relaxation,  increasing ROM, reducing/eliminating soft tissue swelling/inflammation/restriction, improving soft tissue extensibility and allowing for proper ROM for normal function with self care, mobility, lifting and ambulation    TREATMENT PLAN   Plan: Cont POC- Continue emphasis/focus on exercise progression, increasing ROM, and strength. Next visit plan to transition to HEP as pt. Is leaving for [place] for a mos.  Cont. To progress with AROM against gravity as gary. Without compensatory hiking.  Electronically Signed by Dalia Adames, PT , MSPT, OMT-C [id number]               Date: 04/12/2024     Note: If patient does not return for scheduled/recommended follow up visits, this note will serve as a discharge from care along with the most recent update on progress.

## 2024-04-15 ENCOUNTER — HOSPITAL ENCOUNTER (OUTPATIENT)
Dept: PHYSICAL THERAPY | Age: 79
Setting detail: THERAPIES SERIES
Discharge: HOME | End: 2024-04-15
Payer: MEDICARE

## 2024-04-15 PROCEDURE — 97140 MANUAL THERAPY 1/> REGIONS: CPT | Performed by: PHYSICAL THERAPIST

## 2024-04-15 PROCEDURE — 97110 THERAPEUTIC EXERCISES: CPT | Performed by: PHYSICAL THERAPIST

## 2024-04-15 NOTE — FLOWSHEET NOTE
[facility]- Outpatient Rehabilitation and Therapy  [address], [place] [address] office: [phone] fax: [phone]    Physical Therapy: TREATMENT/PROGRESS NOTE   Patient: Marli Lewis (79 y.o. female)   Treatment Date: 04/15/2024   :  1945 MRN: [id number]   Visit #: 21  Insurance Allowable Auth Needed   24 24-24 [x]Yes    []No    Insurance: Payor: HUMANA MEDICARE / Plan: HUMANA CHOICE-PPO MEDICARE / Product Type: *No Product type* /   Insurance ID: [id number] - (Medicare Managed)  Secondary Insurance (if applicable):    Treatment Diagnosis:   Left shoulder pain M25.512    Medical Diagnosis:    Closed fracture of proximal end of left humerus, unspecified fracture morphology, initial encounter [S4]   Referring Physician: Kalen Villegas MD  PCP: Ann Mondragon APRN - [place]                             Plan of care signed (Y/N):     Date of Patient follow up with Physician:      Progress Report/POC: YES, Date Range for this report: 3/12/24 to 4/15/24    POC update due: (10 visits /OR AUTH LIMITS, whichever is less)   Precautions/ Contra-indications:                                                                                          Latex allergy:  NO  Pacemaker:    NO  Contraindications for Manipulation: recent L proximal humerus fx  Date of Surgery:   Other: per MD note:no heavy impact activities, and gentle ROM.      Red Flags:  None    Preferred Language for Healthcare:   [x]English       []other:    SUBJECTIVE EXAMINATION     Patient Report/Comments: still challenged with donning/doffing bra.   Test used Initial score  1/12/24 2024 2/12/24 3/13/24 4/12/24   Pain Summary VAS 2-5/10 1-2/10 1-2/10 0/10 0/10   Functional questionnaire Quick DASH 38=53%    5% 4/15/24   Other:ROM L shoulder flexion  43 A  130 P 145  150 supine 145 supine P  130 AROM 132active   abduction  48 with hike 160 P 160-165 supine 165 supine 112 active   ER @45   72 75

## 2024-06-11 ENCOUNTER — OFFICE VISIT (OUTPATIENT)
Dept: ORTHOPEDIC SURGERY | Age: 79
End: 2024-06-11
Payer: MEDICARE

## 2024-06-11 VITALS — BODY MASS INDEX: 26.11 KG/M2 | WEIGHT: 133 LBS | HEIGHT: 60 IN

## 2024-06-11 DIAGNOSIS — S42.202A CLOSED FRACTURE OF PROXIMAL END OF LEFT HUMERUS, UNSPECIFIED FRACTURE MORPHOLOGY, INITIAL ENCOUNTER: Primary | ICD-10-CM

## 2024-06-11 DIAGNOSIS — S93.401A SPRAIN OF RIGHT ANKLE, UNSPECIFIED LIGAMENT, INITIAL ENCOUNTER: ICD-10-CM

## 2024-06-11 DIAGNOSIS — R22.32 FINGER MASS, LEFT: ICD-10-CM

## 2024-06-11 PROCEDURE — G8427 DOCREV CUR MEDS BY ELIG CLIN: HCPCS | Performed by: ORTHOPAEDIC SURGERY

## 2024-06-11 PROCEDURE — 1090F PRES/ABSN URINE INCON ASSESS: CPT | Performed by: ORTHOPAEDIC SURGERY

## 2024-06-11 PROCEDURE — 99213 OFFICE O/P EST LOW 20 MIN: CPT | Performed by: ORTHOPAEDIC SURGERY

## 2024-06-11 PROCEDURE — 1036F TOBACCO NON-USER: CPT | Performed by: ORTHOPAEDIC SURGERY

## 2024-06-11 PROCEDURE — G8399 PT W/DXA RESULTS DOCUMENT: HCPCS | Performed by: ORTHOPAEDIC SURGERY

## 2024-06-11 PROCEDURE — G8419 CALC BMI OUT NRM PARAM NOF/U: HCPCS | Performed by: ORTHOPAEDIC SURGERY

## 2024-06-11 PROCEDURE — 1123F ACP DISCUSS/DSCN MKR DOCD: CPT | Performed by: ORTHOPAEDIC SURGERY

## 2024-06-11 NOTE — PROGRESS NOTES
Form and available in the patient's chart under the Media tab.        PHYSICAL EXAMINATION:  Ms. Lewis is a very pleasant 79 y.o.  female who presents today in no acute distress, awake, alert, and oriented.  She is well dressed, nourished and  groomed.  Patient with normal affect.  Height is  1.524 m (5'), weight is 60.3 kg (133 lb), Body mass index is 25.97 kg/m².  Resting respiratory rate is 16.       On evaluation of her bilateral upper extremity, there is no obvious deformity left shoulder.  There is no swelling and no ecchymosis.  She is not tender to palpation over the shoulder, and otherwise non tender over the remainder of the extremity.  Range of motion is good of the left shoulder.  The skin overlying the left shoulder is intact without evidence of lesion, laceration.  Distal pulses are 2+ and symmetric bilaterally.  Sensation is grossly intact to light touch and symmetric bilaterally.    Examination of the gait, showed that the patient walks with no limp WB right leg. Examination of both ankles showing a good range of motion of the right ankle compare to the other side.  There is no swelling that can be seen, no ecchymosis over lateral side of the right ankle. She has intact sensation and good pedal pulses. She has no tenderness on deep palpation over the right ankle ATF. The ankles are stable to drawer test bilaterally, equally.  Ankle reflex 1+ bilaterally. She has left long finger volar mass, possible ganglion cyst, no tenderness.      IMAGING:  Xrays taken today in the office, 3 views of left shoulder were reviewed, and showed minimally displaced proximal humerus fracture.    X-rays were taken in the office 11/28/2023, 3 views of the right ankle, and showed no fracture. No other abnormality.     IMPRESSION:   1- Left shoulder pain/ minimally displaced proximal humerus fracture.  2- Right ankle pain/ Ankle ATF sprain.  3- Left long finger volar mass, possible ganglion cyst.    PLAN:    I

## 2024-07-22 NOTE — FLOWSHEET NOTE
Immunization chart prep completed.  Immunization records verified.  Mahi Webb due for Covid, Gardasil 9 (HPV), and Menveo (Meningitis)   improving soft tissue extensibility. Next visit plan to progress reps, add new exercises, and continue manual techniques/add PNF and closed chain ex. As gary.     Electronically Signed by Dalia Adames PT , MSPT, OMT-C [id number]               Date: 03/13/2024     Note: If patient does not return for scheduled/recommended follow up visits, this note will serve as a discharge from care along with the most recent update on progress.

## 2024-08-06 ENCOUNTER — HOSPITAL ENCOUNTER (OUTPATIENT)
Dept: WOMENS IMAGING | Age: 79
Discharge: HOME | End: 2024-08-06
Payer: MEDICARE

## 2024-08-06 VITALS — WEIGHT: 135 LBS | HEIGHT: 58 IN | BODY MASS INDEX: 28.34 KG/M2

## 2024-08-06 DIAGNOSIS — Z12.31 SCREENING MAMMOGRAM FOR BREAST CANCER: ICD-10-CM

## 2024-08-06 PROCEDURE — 77063 BREAST TOMOSYNTHESIS BI: CPT

## (undated) DEVICE — GOWN,SIRUS,NON REINFRCD,LARGE,SET IN SL: Brand: MEDLINE

## (undated) DEVICE — BANDAGE GZ W2XL75IN ST RAYON POLY CNFRM STRTCH LTWT

## (undated) DEVICE — COMFO-TEX ELASTIC BANDAGE LATEX FREE, 2INX5YD: Brand: COMFO-TEX™

## (undated) DEVICE — SET GRAV VENT NVENT CK VLV 3 NDL FREE PRT 10 GTT

## (undated) DEVICE — CATHETER IV 20GA L1.25IN PNK FEP SFTY STR HUB RADPQ DISP

## (undated) DEVICE — SET ADMIN PRIMING 7ML L30IN 7.35LB 20 GTT 2ND RLER CLMP

## (undated) DEVICE — PADDING CAST W4INXL4YD NONSTERILE COT RAYON MICROPLEATED

## (undated) DEVICE — 3M™ TEGADERM™ TRANSPARENT FILM DRESSING FRAME STYLE, 1624W, 2-3/8 IN X 2-3/4 IN (6 CM X 7 CM), 100/CT 4CT/CASE: Brand: 3M™ TEGADERM™

## (undated) DEVICE — Device

## (undated) DEVICE — GLOVE SURG SZ 65 L12IN FNGR THK94MIL STD WHT LTX FREE

## (undated) DEVICE — CHLORAPREP 26ML ORANGE

## (undated) DEVICE — GLOVE,SURG,SENSICARE,ALOE,LF,PF,6: Brand: MEDLINE

## (undated) DEVICE — GLOVE,SURG,SENSICARE,ALOE,LF,PF,7: Brand: MEDLINE

## (undated) DEVICE — GLOVE SURG SZ 75 L12IN FNGR THK94MIL STD WHT LTX FREE

## (undated) DEVICE — CORD ES L12FT BPLR FRCP

## (undated) DEVICE — SOLUTION IV IRRIG 500ML 0.9% SODIUM CHL 2F7123

## (undated) DEVICE — SYRINGE, LUER LOCK, 10ML: Brand: MEDLINE

## (undated) DEVICE — ZIMMER® STERILE DISPOSABLE TOURNIQUET CUFF WITH PLC, DUAL PORT, SINGLE BLADDER, 18 IN. (46 CM)

## (undated) DEVICE — SUTURE ETHLN SZ 4-0 L18IN NONABSORBABLE BLK L19MM PS-2 3/8 1667H

## (undated) DEVICE — ELECTRODE,ECG,STRESS,FOAM,3PK: Brand: MEDLINE

## (undated) DEVICE — SOLUTION IV 1000ML LAC RINGERS PH 6.5 INJ USP VIAFLX PLAS

## (undated) DEVICE — BLADE OPHTH 180DEG CUT SURF BLU STR SHRP DBL BVL GRINDLESS

## (undated) DEVICE — NEEDLE HYPO 25GA L1.5IN BLU POLYPR HUB S STL REG BVL STR

## (undated) DEVICE — STANDARD HYPODERMIC NEEDLE,POLYPROPYLENE HUB: Brand: MONOJECT